# Patient Record
Sex: FEMALE | Race: WHITE | NOT HISPANIC OR LATINO
[De-identification: names, ages, dates, MRNs, and addresses within clinical notes are randomized per-mention and may not be internally consistent; named-entity substitution may affect disease eponyms.]

---

## 2017-10-30 ENCOUNTER — APPOINTMENT (OUTPATIENT)
Dept: ORTHOPEDIC SURGERY | Facility: CLINIC | Age: 42
End: 2017-10-30
Payer: COMMERCIAL

## 2017-10-30 VITALS
HEART RATE: 75 BPM | HEIGHT: 67 IN | SYSTOLIC BLOOD PRESSURE: 151 MMHG | BODY MASS INDEX: 43 KG/M2 | WEIGHT: 274 LBS | DIASTOLIC BLOOD PRESSURE: 100 MMHG

## 2017-10-30 PROCEDURE — 99204 OFFICE O/P NEW MOD 45 MIN: CPT

## 2017-10-30 PROCEDURE — 72100 X-RAY EXAM L-S SPINE 2/3 VWS: CPT

## 2017-10-30 PROCEDURE — 73565 X-RAY EXAM OF KNEES: CPT

## 2017-11-29 ENCOUNTER — FORM ENCOUNTER (OUTPATIENT)
Age: 42
End: 2017-11-29

## 2017-11-30 ENCOUNTER — OUTPATIENT (OUTPATIENT)
Dept: OUTPATIENT SERVICES | Facility: HOSPITAL | Age: 42
LOS: 1 days | End: 2017-11-30
Payer: COMMERCIAL

## 2017-11-30 ENCOUNTER — APPOINTMENT (OUTPATIENT)
Dept: MRI IMAGING | Facility: CLINIC | Age: 42
End: 2017-11-30
Payer: COMMERCIAL

## 2017-11-30 DIAGNOSIS — Z98.89 OTHER SPECIFIED POSTPROCEDURAL STATES: Chronic | ICD-10-CM

## 2017-11-30 DIAGNOSIS — Z00.8 ENCOUNTER FOR OTHER GENERAL EXAMINATION: ICD-10-CM

## 2017-11-30 PROCEDURE — 72148 MRI LUMBAR SPINE W/O DYE: CPT | Mod: 26

## 2017-11-30 PROCEDURE — 72148 MRI LUMBAR SPINE W/O DYE: CPT

## 2018-05-21 ENCOUNTER — APPOINTMENT (OUTPATIENT)
Dept: ORTHOPEDIC SURGERY | Facility: CLINIC | Age: 43
End: 2018-05-21
Payer: COMMERCIAL

## 2018-05-21 VITALS
HEIGHT: 67 IN | HEART RATE: 91 BPM | SYSTOLIC BLOOD PRESSURE: 141 MMHG | DIASTOLIC BLOOD PRESSURE: 86 MMHG | WEIGHT: 270 LBS | BODY MASS INDEX: 42.38 KG/M2

## 2018-05-21 PROCEDURE — 99214 OFFICE O/P EST MOD 30 MIN: CPT

## 2018-07-31 ENCOUNTER — APPOINTMENT (OUTPATIENT)
Dept: ORTHOPEDIC SURGERY | Facility: CLINIC | Age: 43
End: 2018-07-31
Payer: COMMERCIAL

## 2018-07-31 VITALS — HEIGHT: 67 IN | BODY MASS INDEX: 42.38 KG/M2 | WEIGHT: 270 LBS

## 2018-07-31 DIAGNOSIS — M25.562 PAIN IN LEFT KNEE: ICD-10-CM

## 2018-07-31 DIAGNOSIS — M17.12 UNILATERAL PRIMARY OSTEOARTHRITIS, LEFT KNEE: ICD-10-CM

## 2018-07-31 DIAGNOSIS — G89.29 PAIN IN LEFT KNEE: ICD-10-CM

## 2018-07-31 PROCEDURE — 73564 X-RAY EXAM KNEE 4 OR MORE: CPT | Mod: LT

## 2018-07-31 PROCEDURE — 99214 OFFICE O/P EST MOD 30 MIN: CPT

## 2018-10-08 ENCOUNTER — APPOINTMENT (OUTPATIENT)
Dept: ORTHOPEDIC SURGERY | Facility: CLINIC | Age: 43
End: 2018-10-08
Payer: COMMERCIAL

## 2018-10-08 PROCEDURE — 99214 OFFICE O/P EST MOD 30 MIN: CPT

## 2019-05-08 ENCOUNTER — APPOINTMENT (OUTPATIENT)
Dept: VASCULAR SURGERY | Facility: CLINIC | Age: 44
End: 2019-05-08
Payer: COMMERCIAL

## 2019-05-08 VITALS
BODY MASS INDEX: 43 KG/M2 | TEMPERATURE: 98.4 F | SYSTOLIC BLOOD PRESSURE: 153 MMHG | HEIGHT: 67 IN | DIASTOLIC BLOOD PRESSURE: 101 MMHG | WEIGHT: 274 LBS | HEART RATE: 81 BPM

## 2019-05-08 DIAGNOSIS — I83.90 ASYMPTOMATIC VARICOSE VEINS OF UNSPECIFIED LOWER EXTREMITY: ICD-10-CM

## 2019-05-08 PROCEDURE — 93970 EXTREMITY STUDY: CPT

## 2019-05-08 PROCEDURE — 99204 OFFICE O/P NEW MOD 45 MIN: CPT

## 2019-05-08 RX ORDER — VALACYCLOVIR 500 MG/1
TABLET, FILM COATED ORAL
Refills: 0 | Status: ACTIVE | COMMUNITY

## 2019-05-29 ENCOUNTER — RX RENEWAL (OUTPATIENT)
Age: 44
End: 2019-05-29

## 2019-05-31 NOTE — ASSESSMENT
[Arterial/Venous Disease] : arterial/venous disease [FreeTextEntry1] : Ms. MURIEL WILLIAMSON is a 44 year with persistent and worsening right lower extremity venous insufficiency, CEAP classification C _3__which is ___ unresponsive to at least 3 months of compression stockings 20-30 mmHg, leg elevation, exercise and over the counter pain medication_(Ibuprofen).  Patient has complaints of worsening __R_ leg discomfort with swelling, fatigue, heaviness, achiness, cramping, and painful varicosities.  The patient’s symptoms interfere with their ADL’s, such as walking, shopping and house work, and their ability to work and exercise. Patient has intact pulses in both legs without evidence of arterial insufficiency.  \par \par Treatment is indicated not for cosmetic reasons but for symptomatic venous reflux disease with symptoms which is refractory to conservative therapy. Venous duplex study demonstrates right  lower extremity venous insufficiency. The need for definitive effective treatment is based on severe, interfering and worsening reflux symptoms with evidence of venous insufficiency on venous ultrasound. \par \par Patient is a candidate for endovenous ablation treatment of the right  anterior GSV and r leg stab phlebectomies.  \par The risks and benefits of endovenous ablation treatment versus continued conservative management were discussed with the patient.  Patient chooses endovenous ablation treatments. Treatment plan to be scheduled. \par \par

## 2019-05-31 NOTE — HISTORY OF PRESENT ILLNESS
[FreeTextEntry1] : 45 y/o F w/ a years-long h/x of varicose veins. She describes a prominent venous bulge on her R leg, which has been present for many years. It began proximally on the lateral leg and has now been extending distally past the knee and into the lower leg. She describes some aching and a tingling sensation. Pt works as a teacher and she states the pain and tingling is worse at the end of a long day on her feet. Pt. tried to wear comrpession stockings for over 3m w/o improvement in symptoms. . She takes diclofenac PRN for her knee pain.\par \par She denies any erythema or warmth associated with the leg swelling. Denies any pain with ambulation. She denies any h/x of DTV or clotting issues, and denies any FH of hypercoagulable conditions.\par \par Pt's BP was elevated at her visit today, but states she follows regularly with her PCP.

## 2019-05-31 NOTE — DATA REVIEWED
[FreeTextEntry1] : Duplex: no DVTs, no SVTs bl \par +anteriorGSV >2sec reflux bl, R AGSV diameter: 11.3mm, L AGSV diameter: 5.1 mm\par R varicose veins diameter: 7.5mm with >2 sec reflux\par L varicose veins diameter: 4.8mm with >2 sec reflux\par \par

## 2019-05-31 NOTE — PHYSICAL EXAM
[Normal Thyroid] : the thyroid was normal [Normal Breath Sounds] : Normal breath sounds [Normal Heart Sounds] : normal heart sounds [Normal Rate and Rhythm] : normal rate and rhythm [2+] : left 2+ [Varicose Veins Of Lower Extremities] : present [No HSM] : no hepatosplenomegaly [No Rash or Lesion] : No rash or lesion [Alert] : alert [Oriented to Person] : oriented to person [Oriented to Place] : oriented to place [Calm] : calm [Oriented to Time] : oriented to time [Ankle Swelling (On Exam)] : present [Ankle Swelling On The Left] : moderate [Ankle Swelling On The Right] : mild [JVD] : no jugular venous distention  [Right Femoral Bruit] : no bruit heard over the right femoral artery [Left Femoral Bruit] : no bruit heard over the left femoral artery [Abdomen Masses] : No abdominal masses [Abdomen Tenderness] : ~T ~M No abdominal tenderness [de-identified] : well-appearing, no apparent distress [de-identified] : sclera anicteric [de-identified] : deferred [de-identified] : deferred [de-identified] : No swelling or erythema of the joints.

## 2019-06-04 ENCOUNTER — APPOINTMENT (OUTPATIENT)
Dept: VASCULAR SURGERY | Facility: CLINIC | Age: 44
End: 2019-06-04
Payer: COMMERCIAL

## 2019-06-04 PROCEDURE — 36475 ENDOVENOUS RF 1ST VEIN: CPT | Mod: RT

## 2019-06-04 NOTE — PROCEDURE
[FreeTextEntry3] : Procedural safety checklist and time out completed:\par Confirmed patient identification (Patient Name, , and/or medical record number including when possible affirmation by patient or parent/family/other.\par Confirmed procedure with the patient. Consent present, accurate and signed. \par Confirmed special equipment and supplies are present.\par Sterility confirmed. Position verified. \par Site/ side is marked and visible and confirmed. \par Procedure confirmed by consent. Accurate consent including side and site.\par Review of medical records noting correct procedure including site and side.\par MD/PA verifies presence and review of imaging studies and or written report of imaging studies.\par Specify equipment are available for the planned procedure.\par MD/PA has marked the patient's procedural site and side.\par Agreement on the procedure to be performed\par Time out completed.\par All of the above has been confirmed by the team.\par All patient-specific concerns have been addressed\par \par Indication: Right  lower extremity varicose veins with inflammation, leg pain, leg swelling, and leg cramping.  Venous insufficiency/ reflux.\par \par Procedure: radiofrequency ablation right  anterior great saphenous vein. \par 	\par [Ms. MURIEL WILLIAMSON is a 44 year old F with a history of  right lower extremity varicose veins previously seen in the office.  Ultrasound examination demonstrated venous insufficiency. A trial of compression stockings, exercise, elevation, and pain medication was attempted without relief and definitive treatment with radiofrequency ablation was offered. \par \par I have discussed the risks of the procedure at length with the patient. The risks discussed were inclusive of but not limited to infection, irritation at the site of infiltration of local anesthesia, and also rare risk of deep venous thrombosis and pulmonary emboli. The patient agrees to proceed with the procedure. \par The patient was escorted into the procedure room and a time out called.\par The entire limb was prepped and draped in sterile fashion. The RF fiber was placed on the sterile field and connected by a sterile cable. Actuation, temperature, and impedance testing were performed to ensure that all components were connected and operating properly. \par The patient was placed on the procedure table and local anesthesia was instilled in the skin overlying the access site. Under ultrasound guidance, the vein was punctured with a micropuncture needle, using the anterior wall technique. A guide wire was now introduced through the needle, and the needle was then exchanged over the guide wire for a 7F sheath. The guide wire was removed and the RF probe was then placed into the  right anterior great saphenous vein through the sheath and position confirmed using ultrasound guidance. After the RF probe position was verified by ultrasound, tumescent anesthesia consisting of normal saline, 1% lidocaine with 8.4% sodium bicarbonate was infiltrated, under ultrasound guidance, precisely into the perivenous compartment along the entire length of the vein until a “halo” of fluid was noted around the vein. After RF probe position was again confirmed with ultrasound imaging, RF energy was applied. The probe was gradually and carefully withdrawn at a rate of 6.5cm/20seconds. \par \par The total volume injected was _200_ cc. \par Total treatment time was _100_ seconds.\par Treatment length was _26.5_ cm \par _5_cycles of RF performed using the __7 cm probe. \par 3.6_cm from SFJ/SPJ\par \par Estimated Blood Loss: minimal\par Repeat ultrasound of the treated vein was performed confirming successful treatment. The catheter and sheath were withdrawn and hemostasis established with direct pressure. After assuring hemostasis, a sterile 4x4 was placed on the access site and an ACE compression wrap was applied. Patient tolerated procedure well. Patient was given post-procedure instructions and follow up appointment was scheduled.\par \par \par

## 2019-06-07 ENCOUNTER — APPOINTMENT (OUTPATIENT)
Dept: VASCULAR SURGERY | Facility: CLINIC | Age: 44
End: 2019-06-07
Payer: COMMERCIAL

## 2019-06-07 PROCEDURE — 93971 EXTREMITY STUDY: CPT

## 2019-06-14 ENCOUNTER — APPOINTMENT (OUTPATIENT)
Dept: VASCULAR SURGERY | Facility: CLINIC | Age: 44
End: 2019-06-14
Payer: COMMERCIAL

## 2019-06-14 PROCEDURE — 93971 EXTREMITY STUDY: CPT

## 2019-06-17 ENCOUNTER — RX RENEWAL (OUTPATIENT)
Age: 44
End: 2019-06-17

## 2019-06-27 ENCOUNTER — RX RENEWAL (OUTPATIENT)
Age: 44
End: 2019-06-27

## 2019-07-05 ENCOUNTER — APPOINTMENT (OUTPATIENT)
Dept: VASCULAR SURGERY | Facility: CLINIC | Age: 44
End: 2019-07-05
Payer: COMMERCIAL

## 2019-07-05 ENCOUNTER — APPOINTMENT (OUTPATIENT)
Dept: VASCULAR SURGERY | Facility: CLINIC | Age: 44
End: 2019-07-05

## 2019-07-05 PROCEDURE — 37766 PHLEB VEINS - EXTREM 20+: CPT | Mod: RT

## 2019-07-05 NOTE — PROCEDURE
[FreeTextEntry1] : right stab phlebectomy [FreeTextEntry3] : Procedural safety checklist and time out completed:\par Confirmed patient identification (Patient Name, , and/or medical record number including when possible affirmation by patient or parent/family/other.\par Confirmed procedure with the patient. Consent present, accurate and signed. \par Confirmed special equipment and supplies are present.\par Sterility confirmed. Position verified. \par Site/ side is marked and visible and confirmed. \par Procedure confirmed by consent. Accurate consent including side and site.\par Review of medical records noting correct procedure including site and side.\par MD/PA verifies presence and review of imaging studies and or written report of imaging studies.\par Specify equipment are available for the planned procedure.\par MD/PA has marked the patient's procedural site and side.\par Agreement on the procedure to be performed\par Time out completed.\par All of the above has been confirmed by the team.\par All patient-specific concerns have been addressed. \par \par Indication:  Right lower extremity varicose veins with inflammation, leg pain, leg swelling, and leg cramping.  \par \par Procedure: Stab phlebectomy right lower extremity\par \par  Ms. MURIEL WILLIAMSON is a 44 year old F with a history of symptomatic right lower extremity varicose veins. A trial of compression stockings, exercise, elevation, and pain medication was attempted without relief and definitive treatment with microphlebectomy was offered. \par \par I have discussed the risks of the procedure at length with the patient. The risks discussed were inclusive of but not limited to infection, irritation at the site of infiltration of local anesthesia, and also rare risk of deep venous thrombosis and pulmonary emboli. The patient agrees to proceed with the procedure. \par The patient was escorted into the procedure room, the varicose veins for treatment were marked out and the patient placed on the examination table. The entire limb was prepped and draped in sterile fashion and a  time out was called. \par \par Local anesthesia using _70_ cc 1% lidocaine was infiltrated using a 25 gauge needle over the previously marked prominent varicose vein sites.\par Multiple small stab incisions, each less than 1 cm in length was made at the noted sites. With the help of a vein hook, the vein was fished out at each of these sites, rolled over a narrow-tipped mosquito clamp and removed. Hemostasis was secured with leg elevation and application of manual pressure. After assuring hemostasis, a sterile 4x4 was placed on the access sites and an ACE compression wrap was applied. Estimated blood loss: minimal. Patient tolerated procedure well. Patient was given post-procedure instructions and follow up appointment was scheduled. \par \par SIDE - RIGHT \par SITE - CALF / THIGH\par LOCATION - PROXIMAL / DISTAL / LATERAL / ANTERIOR / POSTERIOR	\par \par Total Stab Incisions ____27____\par \par

## 2019-07-19 ENCOUNTER — APPOINTMENT (OUTPATIENT)
Dept: VASCULAR SURGERY | Facility: CLINIC | Age: 44
End: 2019-07-19
Payer: COMMERCIAL

## 2019-07-19 ENCOUNTER — APPOINTMENT (OUTPATIENT)
Dept: VASCULAR SURGERY | Facility: CLINIC | Age: 44
End: 2019-07-19

## 2019-07-19 VITALS
BODY MASS INDEX: 44.73 KG/M2 | SYSTOLIC BLOOD PRESSURE: 133 MMHG | DIASTOLIC BLOOD PRESSURE: 87 MMHG | HEIGHT: 67 IN | TEMPERATURE: 99.1 F | WEIGHT: 285 LBS | HEART RATE: 85 BPM

## 2019-07-19 DIAGNOSIS — I83.893 VARICOSE VEINS OF BILATERAL LOWER EXTREMITIES WITH OTHER COMPLICATIONS: ICD-10-CM

## 2019-07-19 PROCEDURE — 99024 POSTOP FOLLOW-UP VISIT: CPT

## 2019-07-19 RX ORDER — LIDOCAINE HYDROCHLORIDE 10 MG/ML
1 INJECTION, SOLUTION INFILTRATION; PERINEURAL
Qty: 50 | Refills: 0 | Status: COMPLETED | COMMUNITY
Start: 2019-05-29 | End: 2019-07-19

## 2019-07-19 RX ORDER — ALPRAZOLAM 0.5 MG/1
0.5 TABLET ORAL
Qty: 1 | Refills: 0 | Status: COMPLETED | COMMUNITY
Start: 2019-05-29 | End: 2019-07-19

## 2019-07-19 RX ORDER — SODIUM CHLORIDE 9 G/ML
0.9 INJECTION, SOLUTION INTRAVENOUS
Qty: 500 | Refills: 0 | Status: COMPLETED | COMMUNITY
Start: 2019-05-29 | End: 2019-07-19

## 2019-07-19 RX ORDER — SODIUM BICARBONATE 84 MG/ML
8.4 INJECTION, SOLUTION INTRAVENOUS
Qty: 5 | Refills: 0 | Status: COMPLETED | COMMUNITY
Start: 2019-05-29 | End: 2019-07-19

## 2019-07-19 NOTE — PHYSICAL EXAM
[JVD] : no jugular venous distention  [2+] : left 2+ [Varicose Veins Of Lower Extremities] : present [Varicose Veins Of The Left Leg] : of the left leg [No Rash or Lesion] : No rash or lesion [Skin Ulcer] : no ulcer [Skin Induration] : no induration [Alert] : alert [Oriented to Person] : oriented to person [Oriented to Place] : oriented to place [Oriented to Time] : oriented to time [de-identified] : well appearing obese female in NAD [de-identified] : NC / AT [de-identified] : unlabored respirations [de-identified] : reg rhythm [de-identified] : RLE incisions clean, dry and well healed, minimal ecchymosis, no induration. stitch removed from right posterior calf incision.No visible varicosities.\par \par LLE with moderate-sized varicosities predominantly along the anterior thigh and knee area [de-identified] : pleasant and cooperative

## 2019-07-19 NOTE — REASON FOR VISIT
[de-identified] : right anterior great saphenous vein ablation and right stab phlebectomy [de-identified] : Ms. MURIEL WILLIAMSON is a 44 year who presents to the office for follow-up evaluation of her venous insufficiency and varicose veins. Patient is s/p radiofrequency ablation of the right anterior great saphenous vein and right stab phlebectomy on 7/2/2019 and 7/5/2019. Patient is doing well without complaints. Patient reports a decrease in leg swelling, leg pain, and no longer has painful bulging varicosities. Patient denies fever or chills. She reports no bruising or parasthesias. She is very pleased with the results. She has been compliant with her compression stockings.\par \par Patient continues to report left leg discomfort, pain, predominantly along the bulging varicosities and leg swelling despite wearing her compression stockings. 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

## 2019-07-19 NOTE — ADDENDUM
[FreeTextEntry1] : Ms. MURIEL WILLIAMSON is a 44 year who presents to the office for follow-up evaluation of her venous insufficiency and varicose veins. Patient is s/p radiofrequency ablation of the right anterior great saphenous vein and right stab phlebectomy on 7/2/2019 and 7/5/2019. Patient is doing well without complaints. Patient reports a decrease in leg swelling, leg pain, and no longer has painful bulging varicosities. Patient denies fever or chills. She reports no bruising or parasthesias. She is very pleased with the results. She has been compliant with her compression stockings.\par \par Patient continues to report left leg discomfort, pain, predominantly along the bulging varicosities and leg swelling despite wearing her compression stockings. 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 No residual varicosities or leg pain on the right lower extremity. Patient is very pleased with the result and how she feels. Patient with continuos leg pain and swelling of the left lower extremity despite conservative management with compression therapy and leg elevation. Ultrasound showed tributary diameters of less than 3.5 mm and phlebectomy was denied. Re-evaluation patient in 3-6 months. Continue conservative management with compression stockings of medical grade 20-30mmHg, NSAIDs prn, and leg elevation.Will repeat left leg venous lower extremity ultrasound at follow-up visit.

## 2019-07-19 NOTE — REVIEW OF SYSTEMS
[Fever] : no fever [Chills] : no chills [Chest Pain] : no chest pain [Palpitations] : no palpitations [Leg Claudication] : no intermittent leg claudication [Shortness Of Breath] : no shortness of breath [Limb Pain] : limb pain [Limb Weakness] : no limb weakness [Easy Bleeding] : no tendency for easy bleeding [Easy Bruising] : no tendency for easy bruising

## 2020-10-12 ENCOUNTER — APPOINTMENT (OUTPATIENT)
Dept: ORTHOPEDIC SURGERY | Facility: CLINIC | Age: 45
End: 2020-10-12
Payer: COMMERCIAL

## 2020-10-12 VITALS
HEIGHT: 67 IN | WEIGHT: 279 LBS | HEART RATE: 55 BPM | DIASTOLIC BLOOD PRESSURE: 96 MMHG | BODY MASS INDEX: 43.79 KG/M2 | SYSTOLIC BLOOD PRESSURE: 145 MMHG

## 2020-10-12 VITALS — TEMPERATURE: 97.4 F

## 2020-10-12 DIAGNOSIS — M54.16 RADICULOPATHY, LUMBAR REGION: ICD-10-CM

## 2020-10-12 PROCEDURE — 99214 OFFICE O/P EST MOD 30 MIN: CPT

## 2020-10-12 NOTE — DISCUSSION/SUMMARY
[de-identified] : Left sided radiculopathy\par We discussed all options.\par MDP x2.\par PT Lumbar. \par Getting new MRI lumbar.\par F/u to review MRI results. \par F?u in 2/3 weeks.\par All questions were answered, all alternatives discussed and the patient is in complete agreement with that plan. Follow-up appointment as instructed. Any issues and the patient will call or come in sooner.

## 2020-10-12 NOTE — ADDENDUM
[FreeTextEntry1] : This note was authored by Oralia Choe working as a medical scribe for Dr. Rohit Anderson. The note was reviewed, edited, and revised by Dr. Rohit Anderson whom is in agreement with the exam findings, imaging findings, and treatment plan. 10/12/2020.

## 2020-10-12 NOTE — HISTORY OF PRESENT ILLNESS
[Pain] : pain [Stable] : stable [All Other ROS Normal] : All other review of systems are negative except as noted [Worsening] : worsening [de-identified] : 45 year female presents for follow up evaluation of lower back pain. \par Last seen in Oct 2018 for L sided radiculopathy, was recommended to follow up with pain management. \par She is still having back pains and would like PT.\par She is having sciatic pain down her left leg. \par No fever chills sweats nausea vomiting no bowel or bladder dysfunction, no recent weight loss or gain no night pain. This history is in addition to the intake form that I personally reviewed.  [Headache] : no headache [Dizziness] : no dizziness [Fainting] : no fainting [FreeTextEntry1] : \par

## 2020-10-12 NOTE — PHYSICAL EXAM
[Normal] : Gait: normal [Hatch's Sign] : negative Hatch's sign [Pronator Drift] : negative pronator drift [SLR] : negative straight leg raise [de-identified] : 5 out of 5 motor strength, sensation is intact and symmetrical full range of motion flexion extension and rotation, no palpatory tenderness full range of motion of hips knees shoulders and elbows (all four extremities), no atrophy, negative straight leg raise, no pathological reflexes, no swelling, normal ambulation, no apparent distress skin is intact, no palpable lymph nodes, no upper or lower extremity instability, alert and oriented x3 and normal mood. Normal finger-to nose test. \par  [de-identified] : AP/alt knees-mild arthritis bilateral knees-\par  AP/lat lumbar-decreased lordosis, decreased disc height, no spondylolisthesis-reviewed with the patient.  \par MRI lumbar-11/2017 severe foraminal stenosis [id number] with the patient.  [Poor Appearance] : well-appearing [Acute Distress] : not in acute distress [Abl Mood] : in a normal mood [Abl Affect] : with normal affect [Poor Coordination] : normal coordination [Disorientation] : oriented x 3 [FreeTextEntry2] : \par

## 2021-03-04 DIAGNOSIS — Z78.9 OTHER SPECIFIED HEALTH STATUS: ICD-10-CM

## 2021-03-04 DIAGNOSIS — Z00.00 ENCOUNTER FOR GENERAL ADULT MEDICAL EXAMINATION W/OUT ABNORMAL FINDINGS: ICD-10-CM

## 2021-03-04 DIAGNOSIS — Z86.018 PERSONAL HISTORY OF OTHER BENIGN NEOPLASM: ICD-10-CM

## 2021-03-04 DIAGNOSIS — N84.1 POLYP OF CERVIX UTERI: ICD-10-CM

## 2021-03-04 DIAGNOSIS — Z80.3 FAMILY HISTORY OF MALIGNANT NEOPLASM OF BREAST: ICD-10-CM

## 2021-03-04 DIAGNOSIS — F17.200 NICOTINE DEPENDENCE, UNSPECIFIED, UNCOMPLICATED: ICD-10-CM

## 2021-03-04 LAB — CYTOLOGY CVX/VAG DOC THIN PREP: NORMAL

## 2021-05-03 ENCOUNTER — APPOINTMENT (OUTPATIENT)
Dept: OBGYN | Facility: CLINIC | Age: 46
End: 2021-05-03
Payer: COMMERCIAL

## 2021-05-03 VITALS — TEMPERATURE: 97.2 F | BODY MASS INDEX: 44.73 KG/M2 | WEIGHT: 285 LBS | HEIGHT: 67 IN

## 2021-05-03 PROCEDURE — 99213 OFFICE O/P EST LOW 20 MIN: CPT

## 2021-05-03 PROCEDURE — 76830 TRANSVAGINAL US NON-OB: CPT

## 2021-05-03 PROCEDURE — 99072 ADDL SUPL MATRL&STAF TM PHE: CPT

## 2021-05-03 PROCEDURE — 93975 VASCULAR STUDY: CPT

## 2021-05-03 PROCEDURE — 76856 US EXAM PELVIC COMPLETE: CPT | Mod: 59

## 2021-05-03 NOTE — REVIEW OF SYSTEMS
[Frequency] : frequency [Abn Vaginal bleeding] : abnormal vaginal bleeding [Pelvic pain] : pelvic pain

## 2021-05-03 NOTE — PROCEDURE
[Transvaginal Ultrasound] : transvaginal ultrasound [Color Doppler Imaging] : color doppler imaging [FreeTextEntry9] : see active problem list [de-identified] : TRANSABDOMINAL ULTRASOUND \par  [FreeTextEntry3] : see scanned sono report

## 2021-05-03 NOTE — HISTORY OF PRESENT ILLNESS
[Y] : Patient is sexually active [N] : Patient denies prior pregnancies [FreeTextEntry1] : 44 YO PRESENTS FOR ULTRASOUND  DUE TO LARGE FIBROID UTERUS [TextBox_4] : ALSO C/O URINARY FREQUENCY, RIGHT LOWER GROIN PRESSURE AND CHANGE IN PERIOD THIS PAST MONTH- DURATION OF 10 DAYS [Mammogramdate] : 01/21 [BreastSonogramDate] : 01/21 [PapSmeardate] : 11/20 [HPVDate] : 11/20 [LMPDate] : 04/25/21 [MensesFreq] : 28 [MensesLength] : 5 [TextBox_6] : 04/25/21

## 2021-05-03 NOTE — DISCUSSION/SUMMARY
[FreeTextEntry1] : Large fibroid uterus\par DUB\par Urinary frequency\par Pelvic pain\par pt presents for a pelvic sono- see report\par Quality of life is being compromised\par pt counselled on UAE and sent to Dr Laguna\par RTO for a uterine sampling

## 2021-05-12 ENCOUNTER — NON-APPOINTMENT (OUTPATIENT)
Age: 46
End: 2021-05-12

## 2021-05-22 ENCOUNTER — TRANSCRIPTION ENCOUNTER (OUTPATIENT)
Age: 46
End: 2021-05-22

## 2021-05-27 ENCOUNTER — APPOINTMENT (OUTPATIENT)
Dept: OBGYN | Facility: CLINIC | Age: 46
End: 2021-05-27
Payer: COMMERCIAL

## 2021-05-27 DIAGNOSIS — N93.8 OTHER SPECIFIED ABNORMAL UTERINE AND VAGINAL BLEEDING: ICD-10-CM

## 2021-05-27 DIAGNOSIS — Z87.42 PERSONAL HISTORY OF OTHER DISEASES OF THE FEMALE GENITAL TRACT: ICD-10-CM

## 2021-05-27 LAB
HCG UR QL: NEGATIVE
QUALITY CONTROL: YES

## 2021-05-27 PROCEDURE — 81025 URINE PREGNANCY TEST: CPT

## 2021-05-27 PROCEDURE — 99213 OFFICE O/P EST LOW 20 MIN: CPT | Mod: 25

## 2021-05-27 PROCEDURE — 58100 BIOPSY OF UTERUS LINING: CPT

## 2021-05-27 NOTE — PROCEDURE
[Endometrial Biopsy] : Endometrial biopsy [Time out performed] : Pre-procedure time out performed.  Patient's name, date of birth and procedure confirmed. [Consent Obtained] : Consent obtained [Irregular Bleeding] : irregular uterine bleeding [Risks] : risks [Benefits] : benefits [Alternatives] : alternatives [Patient] : patient [Infection] : infection [Bleeding] : bleeding [Allergic Reaction] : allergic reaction [Uterine Perforation] : uterine perforation [Pain] : pain [Negative] : negative pregnancy test [No Premedication] : No premedication [Betadine] : Betadine [None] : none [Easy Passage] : Easy passage [Sounded to ___ cm] : sounded to [unfilled] ~Ucm [Moderate] : moderate [Tolerated Well] : Patient tolerated the procedure well [No Complications] : No complications [LMPDate] : 05/09/2021

## 2021-05-27 NOTE — PLAN
[FreeTextEntry1] : PT PRESENTS WITH DUB AND LARGE FIBROID UTERUS FOR A UTERINE SAMPLING PRE EVALUATION TO SEE DR NESS AYERS FOR CONSULT FOR A UAE\par PT HAS AN APPT FOR A PELVIC MRI AND ALSO AN UPCOMING APPT TO SEE DR NESS CHAVEZ

## 2021-06-06 ENCOUNTER — TRANSCRIPTION ENCOUNTER (OUTPATIENT)
Age: 46
End: 2021-06-06

## 2021-06-06 LAB — CORE LAB BIOPSY: NORMAL

## 2021-06-13 ENCOUNTER — APPOINTMENT (OUTPATIENT)
Dept: MRI IMAGING | Facility: CLINIC | Age: 46
End: 2021-06-13
Payer: COMMERCIAL

## 2021-06-13 ENCOUNTER — OUTPATIENT (OUTPATIENT)
Dept: OUTPATIENT SERVICES | Facility: HOSPITAL | Age: 46
LOS: 1 days | End: 2021-06-13
Payer: COMMERCIAL

## 2021-06-13 DIAGNOSIS — Z98.89 OTHER SPECIFIED POSTPROCEDURAL STATES: Chronic | ICD-10-CM

## 2021-06-13 DIAGNOSIS — D21.9 BENIGN NEOPLASM OF CONNECTIVE AND OTHER SOFT TISSUE, UNSPECIFIED: ICD-10-CM

## 2021-06-13 PROCEDURE — A9585: CPT

## 2021-06-13 PROCEDURE — 72197 MRI PELVIS W/O & W/DYE: CPT

## 2021-06-13 PROCEDURE — 72197 MRI PELVIS W/O & W/DYE: CPT | Mod: 26

## 2021-06-14 ENCOUNTER — TRANSCRIPTION ENCOUNTER (OUTPATIENT)
Age: 46
End: 2021-06-14

## 2021-07-16 ENCOUNTER — APPOINTMENT (OUTPATIENT)
Dept: INTERVENTIONAL RADIOLOGY/VASCULAR | Facility: CLINIC | Age: 46
End: 2021-07-16

## 2021-07-16 VITALS — RESPIRATION RATE: 18 BRPM | HEIGHT: 67 IN | BODY MASS INDEX: 44.89 KG/M2 | WEIGHT: 286 LBS

## 2021-07-16 PROCEDURE — XXXXX: CPT | Mod: 1L

## 2021-07-16 RX ORDER — LOSARTAN POTASSIUM AND HYDROCHLOROTHIAZIDE 25; 100 MG/1; MG/1
100-25 TABLET ORAL
Refills: 0 | Status: ACTIVE | COMMUNITY

## 2021-07-16 RX ORDER — METHYLPREDNISOLONE 4 MG/1
4 TABLET ORAL
Qty: 1 | Refills: 1 | Status: COMPLETED | COMMUNITY
Start: 2020-10-12 | End: 2021-07-16

## 2021-07-16 RX ORDER — DICLOFENAC SODIUM 75 MG/1
75 TABLET, DELAYED RELEASE ORAL
Qty: 1 | Refills: 1 | Status: COMPLETED | COMMUNITY
Start: 2018-10-08 | End: 2021-07-16

## 2021-07-29 ENCOUNTER — APPOINTMENT (OUTPATIENT)
Dept: OBGYN | Facility: CLINIC | Age: 46
End: 2021-07-29
Payer: COMMERCIAL

## 2021-07-29 VITALS
WEIGHT: 291 LBS | BODY MASS INDEX: 45.67 KG/M2 | SYSTOLIC BLOOD PRESSURE: 132 MMHG | DIASTOLIC BLOOD PRESSURE: 86 MMHG | HEART RATE: 90 BPM | HEIGHT: 67 IN

## 2021-07-29 DIAGNOSIS — N84.0 POLYP OF CORPUS UTERI: ICD-10-CM

## 2021-07-29 PROCEDURE — 99213 OFFICE O/P EST LOW 20 MIN: CPT

## 2021-08-06 ENCOUNTER — OUTPATIENT (OUTPATIENT)
Dept: OUTPATIENT SERVICES | Facility: HOSPITAL | Age: 46
LOS: 1 days | End: 2021-08-06
Payer: COMMERCIAL

## 2021-08-06 VITALS
SYSTOLIC BLOOD PRESSURE: 135 MMHG | TEMPERATURE: 97 F | HEART RATE: 63 BPM | HEIGHT: 67.5 IN | RESPIRATION RATE: 16 BRPM | WEIGHT: 293 LBS | DIASTOLIC BLOOD PRESSURE: 74 MMHG | OXYGEN SATURATION: 98 %

## 2021-08-06 DIAGNOSIS — D25.9 LEIOMYOMA OF UTERUS, UNSPECIFIED: ICD-10-CM

## 2021-08-06 DIAGNOSIS — E66.01 MORBID (SEVERE) OBESITY DUE TO EXCESS CALORIES: ICD-10-CM

## 2021-08-06 DIAGNOSIS — I49.3 VENTRICULAR PREMATURE DEPOLARIZATION: ICD-10-CM

## 2021-08-06 DIAGNOSIS — Z87.19 PERSONAL HISTORY OF OTHER DISEASES OF THE DIGESTIVE SYSTEM: Chronic | ICD-10-CM

## 2021-08-06 DIAGNOSIS — Z98.89 OTHER SPECIFIED POSTPROCEDURAL STATES: Chronic | ICD-10-CM

## 2021-08-06 LAB
ALBUMIN SERPL ELPH-MCNC: 4.2 G/DL — SIGNIFICANT CHANGE UP (ref 3.3–5)
ALP SERPL-CCNC: 62 U/L — SIGNIFICANT CHANGE UP (ref 40–120)
ALT FLD-CCNC: 15 U/L — SIGNIFICANT CHANGE UP (ref 4–33)
ANION GAP SERPL CALC-SCNC: 12 MMOL/L — SIGNIFICANT CHANGE UP (ref 7–14)
AST SERPL-CCNC: 14 U/L — SIGNIFICANT CHANGE UP (ref 4–32)
BILIRUB DIRECT SERPL-MCNC: <0.2 MG/DL — SIGNIFICANT CHANGE UP (ref 0–0.2)
BILIRUB INDIRECT FLD-MCNC: >0.2 MG/DL — SIGNIFICANT CHANGE UP (ref 0–1)
BILIRUB SERPL-MCNC: 0.4 MG/DL — SIGNIFICANT CHANGE UP (ref 0.2–1.2)
BUN SERPL-MCNC: 11 MG/DL — SIGNIFICANT CHANGE UP (ref 7–23)
CALCIUM SERPL-MCNC: 9.4 MG/DL — SIGNIFICANT CHANGE UP (ref 8.4–10.5)
CHLORIDE SERPL-SCNC: 105 MMOL/L — SIGNIFICANT CHANGE UP (ref 98–107)
CO2 SERPL-SCNC: 22 MMOL/L — SIGNIFICANT CHANGE UP (ref 22–31)
CREAT SERPL-MCNC: 0.67 MG/DL — SIGNIFICANT CHANGE UP (ref 0.5–1.3)
GLUCOSE SERPL-MCNC: 113 MG/DL — HIGH (ref 70–99)
HCG UR QL: NEGATIVE — SIGNIFICANT CHANGE UP
HCT VFR BLD CALC: 46.6 % — HIGH (ref 34.5–45)
HGB BLD-MCNC: 15.3 G/DL — SIGNIFICANT CHANGE UP (ref 11.5–15.5)
MCHC RBC-ENTMCNC: 29.7 PG — SIGNIFICANT CHANGE UP (ref 27–34)
MCHC RBC-ENTMCNC: 32.8 GM/DL — SIGNIFICANT CHANGE UP (ref 32–36)
MCV RBC AUTO: 90.5 FL — SIGNIFICANT CHANGE UP (ref 80–100)
NRBC # BLD: 0 /100 WBCS — SIGNIFICANT CHANGE UP
NRBC # FLD: 0 K/UL — SIGNIFICANT CHANGE UP
PLATELET # BLD AUTO: 266 K/UL — SIGNIFICANT CHANGE UP (ref 150–400)
POTASSIUM SERPL-MCNC: 4 MMOL/L — SIGNIFICANT CHANGE UP (ref 3.5–5.3)
POTASSIUM SERPL-SCNC: 4 MMOL/L — SIGNIFICANT CHANGE UP (ref 3.5–5.3)
PROT SERPL-MCNC: 6.9 G/DL — SIGNIFICANT CHANGE UP (ref 6–8.3)
RBC # BLD: 5.15 M/UL — SIGNIFICANT CHANGE UP (ref 3.8–5.2)
RBC # FLD: 12.8 % — SIGNIFICANT CHANGE UP (ref 10.3–14.5)
SODIUM SERPL-SCNC: 139 MMOL/L — SIGNIFICANT CHANGE UP (ref 135–145)
WBC # BLD: 8.01 K/UL — SIGNIFICANT CHANGE UP (ref 3.8–10.5)
WBC # FLD AUTO: 8.01 K/UL — SIGNIFICANT CHANGE UP (ref 3.8–10.5)

## 2021-08-06 PROCEDURE — 93010 ELECTROCARDIOGRAM REPORT: CPT

## 2021-08-06 NOTE — H&P PST ADULT - HISTORY OF PRESENT ILLNESS
This is a 47 y/o female who presents with pain from known uterine fibroid. Visited MD with subsequent sonogram and MRI to confirm fibroid. Recommended intervention. Scheduled for uterine artery embolization

## 2021-08-06 NOTE — H&P PST ADULT - NSICDXPASTMEDICALHX_GEN_ALL_CORE_FT
PAST MEDICAL HISTORY:  Choledocholithiasis     Cholelithiasis     Elevated liver function tests     Obesity     Uterine fibroid July 2021

## 2021-08-06 NOTE — H&P PST ADULT - NSANTHOSAYNRD_GEN_A_CORE
No. SIVAN screening performed.  STOP BANG Legend: 0-2 = LOW Risk; 3-4 = INTERMEDIATE Risk; 5-8 = HIGH Risk

## 2021-08-06 NOTE — H&P PST ADULT - NSICDXPROBLEM_GEN_ALL_CORE_FT
PROBLEM DIAGNOSES  Problem: Uterine fibroid  Assessment and Plan: This is a 45 y/o female who is scheduled for UAE on 8-23-21  * Instructed to speak with surgeon regarding covid testing.  * Given preop instructions      Problem: Frequent PVCs  Assessment and Plan: Await cardiac evaluation from pcp/cardiologist due to frequent PVCs on PAST ekg with bigeminy and trigeminy  * Instructed to take normal am dose of losartan/HCTZ the am of surgery    Problem: Morbid obesity  Assessment and Plan: Notified OR booking via fax of bmi > 45       PROBLEM DIAGNOSES  Problem: Uterine fibroid  Assessment and Plan: This is a 45 y/o female who is scheduled for UAE on 8-23-21  * Instructed to speak with surgeon regarding covid testing.  * Given preop instructions      Problem: Frequent PVCs  Assessment and Plan: Await cardiac evaluation from pcp/cardiologist due to frequent PVCs on PAST ekg with bigeminy and trigeminy--left message on NP's voice mail in radiology department   * Instructed to take normal am dose of losartan/HCTZ the am of surgery    Problem: Morbid obesity  Assessment and Plan: Notified OR booking via fax of bmi > 45

## 2021-08-06 NOTE — H&P PST ADULT - NSICDXPASTSURGICALHX_GEN_ALL_CORE_FT
PAST SURGICAL HISTORY:  H/O cholelithiasis s/p cholecystectomy in ? 2010    S/P ERCP 7/8/2015 - with placement of biliary stent--patient not sure if she had a stent inserted

## 2021-08-06 NOTE — H&P PST ADULT - NSICDXFAMILYHX_GEN_ALL_CORE_FT
FAMILY HISTORY:  Mother  Still living? Unknown  Family history of cardiomyopathy, Age at diagnosis: Age Unknown

## 2021-08-29 ENCOUNTER — APPOINTMENT (OUTPATIENT)
Dept: DISASTER EMERGENCY | Facility: CLINIC | Age: 46
End: 2021-08-29

## 2021-08-29 DIAGNOSIS — Z01.818 ENCOUNTER FOR OTHER PREPROCEDURAL EXAMINATION: ICD-10-CM

## 2021-08-31 PROBLEM — E66.9 OBESITY, UNSPECIFIED: Chronic | Status: ACTIVE | Noted: 2021-08-06

## 2021-09-01 ENCOUNTER — RESULT REVIEW (OUTPATIENT)
Age: 46
End: 2021-09-01

## 2021-09-01 ENCOUNTER — INPATIENT (INPATIENT)
Facility: HOSPITAL | Age: 46
LOS: 0 days | Discharge: HOME | End: 2021-09-02
Attending: SPECIALIST | Admitting: SPECIALIST
Payer: COMMERCIAL

## 2021-09-01 VITALS
TEMPERATURE: 98 F | SYSTOLIC BLOOD PRESSURE: 121 MMHG | OXYGEN SATURATION: 99 % | WEIGHT: 293 LBS | DIASTOLIC BLOOD PRESSURE: 66 MMHG | RESPIRATION RATE: 18 BRPM | HEART RATE: 86 BPM | HEIGHT: 67 IN

## 2021-09-01 DIAGNOSIS — I10 ESSENTIAL (PRIMARY) HYPERTENSION: ICD-10-CM

## 2021-09-01 DIAGNOSIS — Z87.19 PERSONAL HISTORY OF OTHER DISEASES OF THE DIGESTIVE SYSTEM: Chronic | ICD-10-CM

## 2021-09-01 DIAGNOSIS — E66.01 MORBID (SEVERE) OBESITY DUE TO EXCESS CALORIES: ICD-10-CM

## 2021-09-01 DIAGNOSIS — R00.0 TACHYCARDIA, UNSPECIFIED: ICD-10-CM

## 2021-09-01 DIAGNOSIS — Z98.89 OTHER SPECIFIED POSTPROCEDURAL STATES: Chronic | ICD-10-CM

## 2021-09-01 DIAGNOSIS — D25.9 LEIOMYOMA OF UTERUS, UNSPECIFIED: ICD-10-CM

## 2021-09-01 LAB
A1C WITH ESTIMATED AVERAGE GLUCOSE RESULT: 5.7 % — HIGH (ref 4–5.6)
ALBUMIN SERPL ELPH-MCNC: 4.1 G/DL — SIGNIFICANT CHANGE UP (ref 3.3–5)
ALP SERPL-CCNC: 63 U/L — SIGNIFICANT CHANGE UP (ref 40–120)
ALT FLD-CCNC: 24 U/L — SIGNIFICANT CHANGE UP (ref 4–33)
ANION GAP SERPL CALC-SCNC: 14 MMOL/L — SIGNIFICANT CHANGE UP (ref 7–14)
AST SERPL-CCNC: 17 U/L — SIGNIFICANT CHANGE UP (ref 4–32)
BILIRUB SERPL-MCNC: 0.4 MG/DL — SIGNIFICANT CHANGE UP (ref 0.2–1.2)
BUN SERPL-MCNC: 17 MG/DL — SIGNIFICANT CHANGE UP (ref 7–23)
CALCIUM SERPL-MCNC: 9 MG/DL — SIGNIFICANT CHANGE UP (ref 8.4–10.5)
CHLORIDE SERPL-SCNC: 101 MMOL/L — SIGNIFICANT CHANGE UP (ref 98–107)
CHOLEST SERPL-MCNC: 152 MG/DL — SIGNIFICANT CHANGE UP
CO2 SERPL-SCNC: 21 MMOL/L — LOW (ref 22–31)
CREAT SERPL-MCNC: 0.76 MG/DL — SIGNIFICANT CHANGE UP (ref 0.5–1.3)
ESTIMATED AVERAGE GLUCOSE: 117 — SIGNIFICANT CHANGE UP
GLUCOSE SERPL-MCNC: 118 MG/DL — HIGH (ref 70–99)
HCG UR QL: NEGATIVE — SIGNIFICANT CHANGE UP
HCT VFR BLD CALC: 41.3 % — SIGNIFICANT CHANGE UP (ref 34.5–45)
HDLC SERPL-MCNC: 41 MG/DL — LOW
HGB BLD-MCNC: 14.2 G/DL — SIGNIFICANT CHANGE UP (ref 11.5–15.5)
INR BLD: 1.02 RATIO — SIGNIFICANT CHANGE UP (ref 0.88–1.16)
LIPID PNL WITH DIRECT LDL SERPL: 96 MG/DL — SIGNIFICANT CHANGE UP
MCHC RBC-ENTMCNC: 30.4 PG — SIGNIFICANT CHANGE UP (ref 27–34)
MCHC RBC-ENTMCNC: 34.4 GM/DL — SIGNIFICANT CHANGE UP (ref 32–36)
MCV RBC AUTO: 88.4 FL — SIGNIFICANT CHANGE UP (ref 80–100)
NON HDL CHOLESTEROL: 111 MG/DL — SIGNIFICANT CHANGE UP
NRBC # BLD: 0 /100 WBCS — SIGNIFICANT CHANGE UP
NRBC # FLD: 0 K/UL — SIGNIFICANT CHANGE UP
PLATELET # BLD AUTO: 260 K/UL — SIGNIFICANT CHANGE UP (ref 150–400)
POTASSIUM SERPL-MCNC: 4 MMOL/L — SIGNIFICANT CHANGE UP (ref 3.5–5.3)
POTASSIUM SERPL-SCNC: 4 MMOL/L — SIGNIFICANT CHANGE UP (ref 3.5–5.3)
PROT SERPL-MCNC: 6.6 G/DL — SIGNIFICANT CHANGE UP (ref 6–8.3)
PROTHROM AB SERPL-ACNC: 11.7 SEC — SIGNIFICANT CHANGE UP (ref 10.6–13.6)
RBC # BLD: 4.67 M/UL — SIGNIFICANT CHANGE UP (ref 3.8–5.2)
RBC # FLD: 13.2 % — SIGNIFICANT CHANGE UP (ref 10.3–14.5)
SODIUM SERPL-SCNC: 136 MMOL/L — SIGNIFICANT CHANGE UP (ref 135–145)
TRIGL SERPL-MCNC: 75 MG/DL — SIGNIFICANT CHANGE UP
WBC # BLD: 10.87 K/UL — HIGH (ref 3.8–10.5)
WBC # FLD AUTO: 10.87 K/UL — HIGH (ref 3.8–10.5)

## 2021-09-01 PROCEDURE — 37243 VASC EMBOLIZE/OCCLUDE ORGAN: CPT

## 2021-09-01 PROCEDURE — 93010 ELECTROCARDIOGRAM REPORT: CPT | Mod: 76

## 2021-09-01 PROCEDURE — 76937 US GUIDE VASCULAR ACCESS: CPT | Mod: 26

## 2021-09-01 PROCEDURE — 99223 1ST HOSP IP/OBS HIGH 75: CPT

## 2021-09-01 PROCEDURE — 36247 INS CATH ABD/L-EXT ART 3RD: CPT

## 2021-09-01 RX ORDER — SODIUM CHLORIDE 9 MG/ML
1000 INJECTION, SOLUTION INTRAVENOUS
Refills: 0 | Status: DISCONTINUED | OUTPATIENT
Start: 2021-09-01 | End: 2021-09-02

## 2021-09-01 RX ORDER — HYDROMORPHONE HYDROCHLORIDE 2 MG/ML
0.5 INJECTION INTRAMUSCULAR; INTRAVENOUS; SUBCUTANEOUS
Refills: 0 | Status: DISCONTINUED | OUTPATIENT
Start: 2021-09-01 | End: 2021-09-02

## 2021-09-01 RX ORDER — LANOLIN ALCOHOL/MO/W.PET/CERES
3 CREAM (GRAM) TOPICAL AT BEDTIME
Refills: 0 | Status: DISCONTINUED | OUTPATIENT
Start: 2021-09-01 | End: 2021-09-02

## 2021-09-01 RX ORDER — ACETAMINOPHEN 500 MG
650 TABLET ORAL EVERY 6 HOURS
Refills: 0 | Status: DISCONTINUED | OUTPATIENT
Start: 2021-09-01 | End: 2021-09-02

## 2021-09-01 RX ORDER — MAGNESIUM SULFATE 500 MG/ML
2 VIAL (ML) INJECTION ONCE
Refills: 0 | Status: COMPLETED | OUTPATIENT
Start: 2021-09-01 | End: 2021-09-01

## 2021-09-01 RX ORDER — CIPROFLOXACIN LACTATE 400MG/40ML
500 VIAL (ML) INTRAVENOUS EVERY 12 HOURS
Refills: 0 | Status: DISCONTINUED | OUTPATIENT
Start: 2021-09-01 | End: 2021-09-02

## 2021-09-01 RX ORDER — KETOROLAC TROMETHAMINE 30 MG/ML
15 SYRINGE (ML) INJECTION EVERY 6 HOURS
Refills: 0 | Status: COMPLETED | OUTPATIENT
Start: 2021-09-01 | End: 2021-09-02

## 2021-09-01 RX ORDER — ONDANSETRON 8 MG/1
4 TABLET, FILM COATED ORAL EVERY 6 HOURS
Refills: 0 | Status: DISCONTINUED | OUTPATIENT
Start: 2021-09-01 | End: 2021-09-02

## 2021-09-01 RX ORDER — NALOXONE HYDROCHLORIDE 4 MG/.1ML
0.1 SPRAY NASAL
Refills: 0 | Status: DISCONTINUED | OUTPATIENT
Start: 2021-09-01 | End: 2021-09-02

## 2021-09-01 RX ORDER — METOPROLOL TARTRATE 50 MG
50 TABLET ORAL DAILY
Refills: 0 | Status: DISCONTINUED | OUTPATIENT
Start: 2021-09-01 | End: 2021-09-02

## 2021-09-01 RX ORDER — HYDROMORPHONE HYDROCHLORIDE 2 MG/ML
30 INJECTION INTRAMUSCULAR; INTRAVENOUS; SUBCUTANEOUS
Refills: 0 | Status: DISCONTINUED | OUTPATIENT
Start: 2021-09-01 | End: 2021-09-02

## 2021-09-01 RX ORDER — LOSARTAN POTASSIUM 100 MG/1
100 TABLET, FILM COATED ORAL DAILY
Refills: 0 | Status: DISCONTINUED | OUTPATIENT
Start: 2021-09-01 | End: 2021-09-02

## 2021-09-01 RX ADMIN — HYDROMORPHONE HYDROCHLORIDE 30 MILLILITER(S): 2 INJECTION INTRAMUSCULAR; INTRAVENOUS; SUBCUTANEOUS at 11:25

## 2021-09-01 RX ADMIN — SODIUM CHLORIDE 50 MILLILITER(S): 9 INJECTION, SOLUTION INTRAVENOUS at 11:25

## 2021-09-01 RX ADMIN — HYDROMORPHONE HYDROCHLORIDE 30 MILLILITER(S): 2 INJECTION INTRAMUSCULAR; INTRAVENOUS; SUBCUTANEOUS at 17:46

## 2021-09-01 RX ADMIN — HYDROMORPHONE HYDROCHLORIDE 30 MILLILITER(S): 2 INJECTION INTRAMUSCULAR; INTRAVENOUS; SUBCUTANEOUS at 19:30

## 2021-09-01 RX ADMIN — Medication 500 MILLIGRAM(S): at 22:36

## 2021-09-01 RX ADMIN — SODIUM CHLORIDE 50 MILLILITER(S): 9 INJECTION, SOLUTION INTRAVENOUS at 19:32

## 2021-09-01 RX ADMIN — Medication 15 MILLIGRAM(S): at 15:47

## 2021-09-01 RX ADMIN — Medication 50 GRAM(S): at 14:28

## 2021-09-01 NOTE — H&P ADULT - HISTORY OF PRESENT ILLNESS
47 years old female with Obesity, "irregular rhythm " and HTN, s/p uterine artery embolization , being seen post op.  Patient was seen by cardiology pre-op , started on Toprol for the " irregular rhythm " , DANNA 2 score is 1.  Patient feels pressure in lower abdomen , wants to get up, telemetry at bedside, occasional bigeminy rhythm, see is on BB.

## 2021-09-01 NOTE — H&P ADULT - PROBLEM SELECTOR PLAN 3
Patient follows with cardiology outpatient , she was cleared by cardiology for procedure  Patient was started on Toprol XL 50 mg  check EKG

## 2021-09-01 NOTE — H&P ADULT - NSHPPHYSICALEXAM_GEN_ALL_CORE
.  VITAL SIGNS:  T(C): --  T(F): --  HR: --  BP: --  BP(mean): --  RR: --  SpO2: --  Wt(kg): --    PHYSICAL EXAM:    Constitutional: WDWN resting comfortably in bed; NAD  Head: NC/AT  Eyes: PERRL, EOMI, clear conjunctiva  ENT: no nasal discharge; uvula midline, no oropharyngeal erythema or exudates; MMM  Neck: supple; no JVD or thyromegaly  Respiratory: CTA B/L; no W/R/R, no retractions  Cardiac: +S1/S2; RRR; no M/R/G; PMI non-displaced  Gastrointestinal: soft, NT/ND; no rebound or guarding; +BSx4  Genitourinary: normal external genitalia  Back: spine midline, no bony tenderness or step-offs; no CVAT B/L  Extremities: WWP, no clubbing or cyanosis; no peripheral edema  Musculoskeletal: NROM x4; no joint swelling, tenderness or erythema  Vascular: 2+ radial, femoral, DP/PT pulses B/L  Dermatologic: skin warm, dry and intact; no rashes, wounds, or scars  Lymphatic: no submandibular or cervical LAD  Neurologic: AAOx3; CNII-XII grossly intact; no focal deficits  Psychiatric: affect and characteristics of appearance, verbalizations, behaviors are appropriate

## 2021-09-01 NOTE — H&P ADULT - NSICDXPASTMEDICALHX_GEN_ALL_CORE_FT
PAST MEDICAL HISTORY:  Choledocholithiasis     Cholelithiasis     Elevated liver function tests     Irregular heart rhythm     Mild HTN     Obesity     Uterine fibroid July 2021

## 2021-09-02 ENCOUNTER — TRANSCRIPTION ENCOUNTER (OUTPATIENT)
Age: 46
End: 2021-09-02

## 2021-09-02 VITALS
HEART RATE: 64 BPM | SYSTOLIC BLOOD PRESSURE: 118 MMHG | TEMPERATURE: 98 F | RESPIRATION RATE: 16 BRPM | DIASTOLIC BLOOD PRESSURE: 72 MMHG | OXYGEN SATURATION: 100 %

## 2021-09-02 DIAGNOSIS — R68.89 OTHER GENERAL SYMPTOMS AND SIGNS: ICD-10-CM

## 2021-09-02 PROBLEM — I10 ESSENTIAL (PRIMARY) HYPERTENSION: Chronic | Status: ACTIVE | Noted: 2021-09-01

## 2021-09-02 LAB
ANION GAP SERPL CALC-SCNC: 15 MMOL/L — HIGH (ref 7–14)
APTT BLD: 32.6 SEC — SIGNIFICANT CHANGE UP (ref 27–36.3)
BUN SERPL-MCNC: 17 MG/DL — SIGNIFICANT CHANGE UP (ref 7–23)
CALCIUM SERPL-MCNC: 8.9 MG/DL — SIGNIFICANT CHANGE UP (ref 8.4–10.5)
CHLORIDE SERPL-SCNC: 98 MMOL/L — SIGNIFICANT CHANGE UP (ref 98–107)
CO2 SERPL-SCNC: 22 MMOL/L — SIGNIFICANT CHANGE UP (ref 22–31)
COVID-19 SPIKE DOMAIN AB INTERP: POSITIVE
COVID-19 SPIKE DOMAIN ANTIBODY RESULT: >250 U/ML — HIGH
CREAT SERPL-MCNC: 0.79 MG/DL — SIGNIFICANT CHANGE UP (ref 0.5–1.3)
GLUCOSE SERPL-MCNC: 108 MG/DL — HIGH (ref 70–99)
HCT VFR BLD CALC: 40.4 % — SIGNIFICANT CHANGE UP (ref 34.5–45)
HGB BLD-MCNC: 13.8 G/DL — SIGNIFICANT CHANGE UP (ref 11.5–15.5)
MAGNESIUM SERPL-MCNC: 2.1 MG/DL — SIGNIFICANT CHANGE UP (ref 1.6–2.6)
MCHC RBC-ENTMCNC: 30.2 PG — SIGNIFICANT CHANGE UP (ref 27–34)
MCHC RBC-ENTMCNC: 34.2 GM/DL — SIGNIFICANT CHANGE UP (ref 32–36)
MCV RBC AUTO: 88.4 FL — SIGNIFICANT CHANGE UP (ref 80–100)
NRBC # BLD: 0 /100 WBCS — SIGNIFICANT CHANGE UP
NRBC # FLD: 0 K/UL — SIGNIFICANT CHANGE UP
PHOSPHATE SERPL-MCNC: 3.6 MG/DL — SIGNIFICANT CHANGE UP (ref 2.5–4.5)
PLATELET # BLD AUTO: 205 K/UL — SIGNIFICANT CHANGE UP (ref 150–400)
POTASSIUM SERPL-MCNC: 3.5 MMOL/L — SIGNIFICANT CHANGE UP (ref 3.5–5.3)
POTASSIUM SERPL-SCNC: 3.5 MMOL/L — SIGNIFICANT CHANGE UP (ref 3.5–5.3)
RBC # BLD: 4.57 M/UL — SIGNIFICANT CHANGE UP (ref 3.8–5.2)
RBC # FLD: 13.3 % — SIGNIFICANT CHANGE UP (ref 10.3–14.5)
SARS-COV-2 IGG+IGM SERPL QL IA: >250 U/ML — HIGH
SARS-COV-2 IGG+IGM SERPL QL IA: POSITIVE
SODIUM SERPL-SCNC: 135 MMOL/L — SIGNIFICANT CHANGE UP (ref 135–145)
WBC # BLD: 9.36 K/UL — SIGNIFICANT CHANGE UP (ref 3.8–10.5)
WBC # FLD AUTO: 9.36 K/UL — SIGNIFICANT CHANGE UP (ref 3.8–10.5)

## 2021-09-02 PROCEDURE — 99231 SBSQ HOSP IP/OBS SF/LOW 25: CPT

## 2021-09-02 PROCEDURE — 93010 ELECTROCARDIOGRAM REPORT: CPT | Mod: 77

## 2021-09-02 PROCEDURE — 99239 HOSP IP/OBS DSCHRG MGMT >30: CPT

## 2021-09-02 RX ORDER — CIPROFLOXACIN LACTATE 400MG/40ML
1 VIAL (ML) INTRAVENOUS
Qty: 1 | Refills: 0
Start: 2021-09-02 | End: 2021-09-02

## 2021-09-02 RX ORDER — OXYCODONE HYDROCHLORIDE 5 MG/1
5 TABLET ORAL EVERY 4 HOURS
Refills: 0 | Status: DISCONTINUED | OUTPATIENT
Start: 2021-09-02 | End: 2021-09-02

## 2021-09-02 RX ORDER — ACETAMINOPHEN 500 MG
650 TABLET ORAL EVERY 6 HOURS
Refills: 0 | Status: DISCONTINUED | OUTPATIENT
Start: 2021-09-02 | End: 2021-09-02

## 2021-09-02 RX ORDER — POTASSIUM CHLORIDE 20 MEQ
40 PACKET (EA) ORAL ONCE
Refills: 0 | Status: COMPLETED | OUTPATIENT
Start: 2021-09-02 | End: 2021-09-02

## 2021-09-02 RX ADMIN — Medication 40 MILLIEQUIVALENT(S): at 09:57

## 2021-09-02 RX ADMIN — Medication 15 MILLIGRAM(S): at 10:29

## 2021-09-02 RX ADMIN — HYDROMORPHONE HYDROCHLORIDE 30 MILLILITER(S): 2 INJECTION INTRAMUSCULAR; INTRAVENOUS; SUBCUTANEOUS at 07:32

## 2021-09-02 RX ADMIN — Medication 50 MILLIGRAM(S): at 04:11

## 2021-09-02 RX ADMIN — Medication 15 MILLIGRAM(S): at 04:12

## 2021-09-02 RX ADMIN — SODIUM CHLORIDE 50 MILLILITER(S): 9 INJECTION, SOLUTION INTRAVENOUS at 07:30

## 2021-09-02 RX ADMIN — Medication 500 MILLIGRAM(S): at 10:29

## 2021-09-02 RX ADMIN — LOSARTAN POTASSIUM 100 MILLIGRAM(S): 100 TABLET, FILM COATED ORAL at 04:11

## 2021-09-02 NOTE — DISCHARGE NOTE PROVIDER - NSDCFUSCHEDAPPT_GEN_ALL_CORE_FT
MURIEL WILLIAMSON ; 09/29/2021 ; NPP OB/GYN Surg [address]  MURIEL WILLIAMSON ; 09/30/2021 ; NP Rad -[address]

## 2021-09-02 NOTE — PROGRESS NOTE ADULT - SUBJECTIVE AND OBJECTIVE BOX
s/p UAE on 9/1/21  Pt. doing well post procedure  slight pelvic cramping  no N/V      Vital Signs Last 24 Hrs  T(C): 36.8 (02 Sep 2021 07:30), Max: 37 (02 Sep 2021 03:30)  T(F): 98.3 (02 Sep 2021 07:30), Max: 98.6 (02 Sep 2021 03:30)  HR: 60 (02 Sep 2021 07:30) (60 - 86)  BP: 105/59 (02 Sep 2021 07:30) (105/59 - 144/66)  BP(mean): --  RR: 16 (02 Sep 2021 07:30) (16 - 19)  SpO2: 100% (02 Sep 2021 07:30) (99% - 100%)    Focused Exam findings:    General: NAD  Abdomen: soft, NT, ND  R groin access site c/d/i  no hematoma        LABS:                        13.8   9.36  )-----------( 205      ( 02 Sep 2021 05:15 )             40.4     09-02    135  |  98  |  17  ----------------------------<  108<H>  3.5   |  22  |  0.79    Ca    8.9      02 Sep 2021 05:15  Phos  3.6     09-02  Mg     2.10     09-02    TPro  6.6  /  Alb  4.1  /  TBili  0.4  /  DBili  x   /  AST  17  /  ALT  24  /  AlkPhos  63  09-01    I&O's Detail    01 Sep 2021 07:01  -  02 Sep 2021 07:00  --------------------------------------------------------  IN:    Lactated Ringers: 700 mL    Oral Fluid: 730 mL  Total IN: 1430 mL    OUT:    Voided (mL): 540 mL  Total OUT: 540 mL    Total NET: 890 mL          
Anesthesia Pain Management Service    SUBJECTIVE: Pt now off IV PCA without problems reported.    Therapy:	  [ X] IV PCA	   [ ] Epidural           [ ] s/p Spinal Opoid              [ ] Postpartum infusion	  [ ] Patient controlled regional anesthesia (PCRA)    [ ] prn Analgesics    Allergies    amoxicillin (Other)  penicillin (Other)    Intolerances      MEDICATIONS  (STANDING):  acetaminophen   Tablet .. 650 milliGRAM(s) Oral every 6 hours  ciprofloxacin     Tablet 500 milliGRAM(s) Oral every 12 hours  ketorolac   Injectable 15 milliGRAM(s) IV Push every 6 hours  lactated ringers. 1000 milliLiter(s) (50 mL/Hr) IV Continuous <Continuous>  losartan 100 milliGRAM(s) Oral daily  metoprolol succinate ER 50 milliGRAM(s) Oral daily    MEDICATIONS  (PRN):  aluminum hydroxide/magnesium hydroxide/simethicone Suspension 30 milliLiter(s) Oral every 4 hours PRN Dyspepsia  melatonin 3 milliGRAM(s) Oral at bedtime PRN Insomnia  naloxone Injectable 0.1 milliGRAM(s) IV Push every 3 minutes PRN For ANY of the following changes in patient status:  A. RR LESS THAN 10 breaths per minute, B. Oxygen saturation LESS THAN 90%, C. Sedation score of 6  ondansetron Injectable 4 milliGRAM(s) IV Push every 6 hours PRN Nausea  oxyCODONE    IR 5 milliGRAM(s) Oral every 4 hours PRN Moderate to Severe Pain (4 - 10)      OBJECTIVE:   [X] No new signs     [ ] Other:    Side Effects:  [X ] None			[ ] Other:    Assessment of Catheter Site:		[ ] Intact		[ ] Other:    ASSESSMENT/PLAN  [ ] Continue current therapy    [X ] Therapy changed to:    [ ] IV PCA       [ ] Epidural     [ X] prn Analgesics     Comments: PRN Oral/IV opioids and/or non-opioid adjuvant analgesics to be used at this point.    
ANESTHESIA POSTOP CHECK    46y Female POSTOP DAY 1      Vital Signs Last 24 Hrs  T(C): 36.4 (02 Sep 2021 13:45), Max: 37 (02 Sep 2021 03:30)  T(F): 97.6 (02 Sep 2021 13:45), Max: 98.6 (02 Sep 2021 03:30)  HR: 64 (02 Sep 2021 13:45) (59 - 86)  BP: 118/72 (02 Sep 2021 13:45) (105/59 - 144/66)  RR: 16 (02 Sep 2021 13:45) (16 - 19)  SpO2: 100% (02 Sep 2021 13:45) (98% - 100%)        [x ] NO APPARENT ANESTHESIA COMPLICATIONS      Comments: 
Anesthesia Pain Management Service    SUBJECTIVE: Patient is doing well with IV PCA and no significant problems reported.  Patient has some cramping by her groin.    Pain Scale Score	At rest: _2/10__ 	With Activity: ___ 	[X ] Refer to charted pain scores    THERAPY:    [ ] IV PCA Morphine		[ ] 5 mg/mL	[ ] 1 mg/mL  [X ] IV PCA Hydromorphone	[ ] 5 mg/mL	[X ] 1 mg/mL  [ ] IV PCA Fentanyl		[ ] 50 micrograms/mL    Demand dose __0.2_ lockout __6_ (minutes) Continuous Rate _0__ Total: __0.4_   mg used (in past 24 hrs)      MEDICATIONS  (STANDING):  acetaminophen   Tablet .. 650 milliGRAM(s) Oral every 6 hours  ciprofloxacin     Tablet 500 milliGRAM(s) Oral every 12 hours  ketorolac   Injectable 15 milliGRAM(s) IV Push every 6 hours  lactated ringers. 1000 milliLiter(s) (50 mL/Hr) IV Continuous <Continuous>  losartan 100 milliGRAM(s) Oral daily  metoprolol succinate ER 50 milliGRAM(s) Oral daily    MEDICATIONS  (PRN):  aluminum hydroxide/magnesium hydroxide/simethicone Suspension 30 milliLiter(s) Oral every 4 hours PRN Dyspepsia  melatonin 3 milliGRAM(s) Oral at bedtime PRN Insomnia  naloxone Injectable 0.1 milliGRAM(s) IV Push every 3 minutes PRN For ANY of the following changes in patient status:  A. RR LESS THAN 10 breaths per minute, B. Oxygen saturation LESS THAN 90%, C. Sedation score of 6  ondansetron Injectable 4 milliGRAM(s) IV Push every 6 hours PRN Nausea  oxyCODONE    IR 5 milliGRAM(s) Oral every 4 hours PRN Moderate to Severe Pain (4 - 10)      OBJECTIVE:  Patient is standing by the bedside.     Sedation Score:	[ X] Alert	[ ] Drowsy 	[ ] Arousable	[ ] Asleep	[ ] Unresponsive    Side Effects:	[X ] None	[ ] Nausea	[ ] Vomiting	[ ] Pruritus  		[ ] Other:    Vital Signs Last 24 Hrs  T(C): 36.8 (02 Sep 2021 07:30), Max: 37 (02 Sep 2021 03:30)  T(F): 98.3 (02 Sep 2021 07:30), Max: 98.6 (02 Sep 2021 03:30)  HR: 60 (02 Sep 2021 07:30) (60 - 86)  BP: 105/59 (02 Sep 2021 07:30) (105/59 - 144/66)  BP(mean): --  RR: 16 (02 Sep 2021 07:30) (16 - 19)  SpO2: 100% (02 Sep 2021 07:30) (99% - 100%)    ASSESSMENT/ PLAN    Therapy to  be:	[ ] Continue   [ X] Discontinued   [X ] Change to prn Analgesics    Documentation and Verification of current medications:   [X] Done	[ ] Not done, not elligible    Comments: IV Dilaudid PCA discontinued. PRN Oral/IV opioids and/or Adjuvant non-opioid medication to be ordered at this point.    
Patient is feeling better, pain is controlled.  HBa1C is 5.7 discussed with patient.    .  VITAL SIGNS:  T(C): 36.4 (09-02-21 @ 12:07), Max: 37 (09-02-21 @ 03:30)  T(F): 97.6 (09-02-21 @ 12:07), Max: 98.6 (09-02-21 @ 03:30)  HR: 59 (09-02-21 @ 12:07) (59 - 86)  BP: 110/68 (09-02-21 @ 12:07) (105/59 - 144/66)  BP(mean): --  RR: 18 (09-02-21 @ 12:07) (16 - 19)  SpO2: 98% (09-02-21 @ 12:07) (98% - 100%)  Wt(kg): --    PHYSICAL EXAM:    Constitutional: WDWN resting comfortably in bed; NAD  Head: NC/AT  Eyes: PERRL, EOMI, clear conjunctiva  ENT: no nasal discharge; uvula midline, no oropharyngeal erythema or exudates; MMM  Neck: supple; no JVD or thyromegaly  Respiratory: CTA B/L; no W/R/R, no retractions  Cardiac: +S1/S2; RRR; no M/R/G; PMI non-displaced  Gastrointestinal: soft, NT/ND; no rebound or guarding; +BSx4  Genitourinary: normal external genitalia  Back: spine midline, no bony tenderness or step-offs; no CVAT B/L  Extremities: WWP, no clubbing or cyanosis; no peripheral edema  Musculoskeletal: NROM x4; no joint swelling, tenderness or erythema  Vascular: 2+ radial, femoral, DP/PT pulses B/L  Dermatologic: skin warm, dry and intact; no rashes, wounds, or scars  Lymphatic: no submandibular or cervical LAD  Neurologic: AAOx3; CNII-XII grossly intact; no focal deficits  Psychiatric: affect and characteristics of appearance, verbalizations, behaviors are appropriate    .  LABS:                         13.8   9.36  )-----------( 205      ( 02 Sep 2021 05:15 )             40.4     09-02    135  |  98  |  17  ----------------------------<  108<H>  3.5   |  22  |  0.79    Ca    8.9      02 Sep 2021 05:15  Phos  3.6     09-02  Mg     2.10     09-02    TPro  6.6  /  Alb  4.1  /  TBili  0.4  /  DBili  x   /  AST  17  /  ALT  24  /  AlkPhos  63  09-01    PT/INR - ( 01 Sep 2021 18:05 )   PT: 11.7 sec;   INR: 1.02 ratio         PTT - ( 02 Sep 2021 05:15 )  PTT:32.6 sec              RADIOLOGY, EKG & ADDITIONAL TESTS: Reviewed.

## 2021-09-02 NOTE — DISCHARGE NOTE PROVIDER - CARE PROVIDERS DIRECT ADDRESSES
,DirectAddress_[email or web address]

## 2021-09-02 NOTE — DISCHARGE NOTE PROVIDER - PROVIDER TOKENS
PROVIDER:[TOKEN:[[id number]],SCHEDULEDAPPT:[09/30/2021],SCHEDULEDAPPTTIME:[10:00 AM]],PROVIDER:[TOKEN:[[id number]],SCHEDULEDAPPT:[09/29/2021],SCHEDULEDAPPTTIME:[07:30 AM],ESTABLISHEDPATIENT:[T]]

## 2021-09-02 NOTE — DISCHARGE NOTE PROVIDER - HOSPITAL COURSE
47 years old female with Obesity, "irregular rhythm" and HTN now s/p uterine artery embolization 9/1. Patient without further complains of pain and is medically stable for discharge with outpatient follow up. On 9/1 this case was reviewed with Dr. Tian, the patient is medically stable and optimized for discharge. All medications were reviewed and prescriptions were sent to mutually agreed upon pharmacy.

## 2021-09-02 NOTE — PROGRESS NOTE ADULT - PROBLEM SELECTOR PLAN 1
Pre-diabetes , HB A1C is 5.7  Patient was counselled regarding weight loss, diet/exercise/follow up PCP

## 2021-09-02 NOTE — PROGRESS NOTE ADULT - NSPROGADDITIONALINFOA_GEN_ALL_CORE
Patient is stable for DC, d/w PA.  Med reconciliation reviewed.  Time spent evaluating patient and  coordinating care <45 min

## 2021-09-02 NOTE — DISCHARGE NOTE NURSING/CASE MANAGEMENT/SOCIAL WORK - PATIENT PORTAL LINK FT
You can access the FollowMyHealth Patient Portal offered by [facility] by registering at the following website: [email or web address]. By joining [employer]’s FollowMyHealth portal, you will also be able to view your health information using other applications (apps) compatible with our system.

## 2021-09-02 NOTE — PROGRESS NOTE ADULT - ASSESSMENT
46 year old female with symptomatic fibroids s/p UAE on 0/1/21    Plan:  Patient cleared by IR for d/c home  take Cipro 500mg Q12h x 10days  take Ibuprofen 400mg q6h x 3-4 days   Perocet 5/325mg q4-6h prn  if taking Percocet, also take Colace and Senna to prevent constipation  drink plenty of water   no bath x 5days, she can shower  no heavy lifting x 1 week  F/U appt on 9/30/21 at 10am with Dr. Bardales ([facility])   Pt. given info.  
47 years old female with Obesity, "irregular rhythm " and HTN, s/p uterine artery embolization , being seen post op.  Patient was seen by cardiology pre-op , started on Toprol for the " irregular rhythm " , DANNA 2 score is 1.  Patient feels pressure in lower abdomen , wants to get up, telemetry at bedside, occasional bigeminy rhythm, see is on BB.

## 2021-09-02 NOTE — DISCHARGE NOTE PROVIDER - NSDCCPCAREPLAN_GEN_ALL_CORE_FT
PRINCIPAL DISCHARGE DIAGNOSIS  Diagnosis: Uterine fibroid  Assessment and Plan of Treatment: You underwent a uterine artery embolization on 9/1 with Dr. Bardales (Interventional Radiology). Follow up with Dr. Bardales and your outpatient GYN for further monitoring within 1 month.  ** Take your last dose of antibiotics tonight with dinner.

## 2021-09-08 DIAGNOSIS — M79.671 PAIN IN RIGHT FOOT: ICD-10-CM

## 2021-09-09 ENCOUNTER — APPOINTMENT (OUTPATIENT)
Dept: ULTRASOUND IMAGING | Facility: CLINIC | Age: 46
End: 2021-09-09
Payer: COMMERCIAL

## 2021-09-09 ENCOUNTER — OUTPATIENT (OUTPATIENT)
Dept: OUTPATIENT SERVICES | Facility: HOSPITAL | Age: 46
LOS: 1 days | End: 2021-09-09
Payer: COMMERCIAL

## 2021-09-09 DIAGNOSIS — Z87.19 PERSONAL HISTORY OF OTHER DISEASES OF THE DIGESTIVE SYSTEM: Chronic | ICD-10-CM

## 2021-09-09 DIAGNOSIS — Z98.89 OTHER SPECIFIED POSTPROCEDURAL STATES: Chronic | ICD-10-CM

## 2021-09-09 DIAGNOSIS — Z00.8 ENCOUNTER FOR OTHER GENERAL EXAMINATION: ICD-10-CM

## 2021-09-09 DIAGNOSIS — D21.9 BENIGN NEOPLASM OF CONNECTIVE AND OTHER SOFT TISSUE, UNSPECIFIED: ICD-10-CM

## 2021-09-09 DIAGNOSIS — M79.671 PAIN IN RIGHT FOOT: ICD-10-CM

## 2021-09-09 PROCEDURE — 93971 EXTREMITY STUDY: CPT

## 2021-09-09 PROCEDURE — 93971 EXTREMITY STUDY: CPT | Mod: 26,RT

## 2021-09-10 ENCOUNTER — TRANSCRIPTION ENCOUNTER (OUTPATIENT)
Age: 46
End: 2021-09-10

## 2021-09-14 ENCOUNTER — TRANSCRIPTION ENCOUNTER (OUTPATIENT)
Age: 46
End: 2021-09-14

## 2021-09-15 ENCOUNTER — OUTPATIENT (OUTPATIENT)
Dept: OUTPATIENT SERVICES | Facility: HOSPITAL | Age: 46
LOS: 1 days | End: 2021-09-15
Payer: COMMERCIAL

## 2021-09-15 VITALS
WEIGHT: 287.92 LBS | DIASTOLIC BLOOD PRESSURE: 80 MMHG | TEMPERATURE: 98 F | RESPIRATION RATE: 16 BRPM | HEIGHT: 67 IN | OXYGEN SATURATION: 98 % | SYSTOLIC BLOOD PRESSURE: 110 MMHG | HEART RATE: 94 BPM

## 2021-09-15 DIAGNOSIS — Z86.79 PERSONAL HISTORY OF OTHER DISEASES OF THE CIRCULATORY SYSTEM: Chronic | ICD-10-CM

## 2021-09-15 DIAGNOSIS — R73.03 PREDIABETES: ICD-10-CM

## 2021-09-15 DIAGNOSIS — Z98.89 OTHER SPECIFIED POSTPROCEDURAL STATES: Chronic | ICD-10-CM

## 2021-09-15 DIAGNOSIS — I49.9 CARDIAC ARRHYTHMIA, UNSPECIFIED: ICD-10-CM

## 2021-09-15 DIAGNOSIS — Z86.39 PERSONAL HISTORY OF OTHER ENDOCRINE, NUTRITIONAL AND METABOLIC DISEASE: ICD-10-CM

## 2021-09-15 DIAGNOSIS — Z87.19 PERSONAL HISTORY OF OTHER DISEASES OF THE DIGESTIVE SYSTEM: Chronic | ICD-10-CM

## 2021-09-15 DIAGNOSIS — N84.0 POLYP OF CORPUS UTERI: ICD-10-CM

## 2021-09-15 DIAGNOSIS — D21.9 BENIGN NEOPLASM OF CONNECTIVE AND OTHER SOFT TISSUE, UNSPECIFIED: Chronic | ICD-10-CM

## 2021-09-15 LAB
A1C WITH ESTIMATED AVERAGE GLUCOSE RESULT: 5.5 % — SIGNIFICANT CHANGE UP (ref 4–5.6)
ALBUMIN SERPL ELPH-MCNC: 4.4 G/DL — SIGNIFICANT CHANGE UP (ref 3.3–5)
ALP SERPL-CCNC: 66 U/L — SIGNIFICANT CHANGE UP (ref 40–120)
ALT FLD-CCNC: 19 U/L — SIGNIFICANT CHANGE UP (ref 4–33)
ANION GAP SERPL CALC-SCNC: 14 MMOL/L — SIGNIFICANT CHANGE UP (ref 7–14)
AST SERPL-CCNC: 18 U/L — SIGNIFICANT CHANGE UP (ref 4–32)
BILIRUB SERPL-MCNC: 0.4 MG/DL — SIGNIFICANT CHANGE UP (ref 0.2–1.2)
BUN SERPL-MCNC: 16 MG/DL — SIGNIFICANT CHANGE UP (ref 7–23)
CALCIUM SERPL-MCNC: 9.3 MG/DL — SIGNIFICANT CHANGE UP (ref 8.4–10.5)
CHLORIDE SERPL-SCNC: 104 MMOL/L — SIGNIFICANT CHANGE UP (ref 98–107)
CO2 SERPL-SCNC: 22 MMOL/L — SIGNIFICANT CHANGE UP (ref 22–31)
CREAT SERPL-MCNC: 0.71 MG/DL — SIGNIFICANT CHANGE UP (ref 0.5–1.3)
ESTIMATED AVERAGE GLUCOSE: 111 — SIGNIFICANT CHANGE UP
GLUCOSE SERPL-MCNC: 102 MG/DL — HIGH (ref 70–99)
HCG SERPL-ACNC: <5 MIU/ML — SIGNIFICANT CHANGE UP
HCT VFR BLD CALC: 40.5 % — SIGNIFICANT CHANGE UP (ref 34.5–45)
HGB BLD-MCNC: 14.3 G/DL — SIGNIFICANT CHANGE UP (ref 11.5–15.5)
MCHC RBC-ENTMCNC: 30.3 PG — SIGNIFICANT CHANGE UP (ref 27–34)
MCHC RBC-ENTMCNC: 35.3 GM/DL — SIGNIFICANT CHANGE UP (ref 32–36)
MCV RBC AUTO: 85.8 FL — SIGNIFICANT CHANGE UP (ref 80–100)
NRBC # BLD: 0 /100 WBCS — SIGNIFICANT CHANGE UP
NRBC # FLD: 0 K/UL — SIGNIFICANT CHANGE UP
PLATELET # BLD AUTO: 283 K/UL — SIGNIFICANT CHANGE UP (ref 150–400)
POTASSIUM SERPL-MCNC: 3.1 MMOL/L — LOW (ref 3.5–5.3)
POTASSIUM SERPL-SCNC: 3.1 MMOL/L — LOW (ref 3.5–5.3)
PROT SERPL-MCNC: 7 G/DL — SIGNIFICANT CHANGE UP (ref 6–8.3)
RBC # BLD: 4.72 M/UL — SIGNIFICANT CHANGE UP (ref 3.8–5.2)
RBC # FLD: 12.3 % — SIGNIFICANT CHANGE UP (ref 10.3–14.5)
SODIUM SERPL-SCNC: 140 MMOL/L — SIGNIFICANT CHANGE UP (ref 135–145)
WBC # BLD: 9.8 K/UL — SIGNIFICANT CHANGE UP (ref 3.8–10.5)
WBC # FLD AUTO: 9.8 K/UL — SIGNIFICANT CHANGE UP (ref 3.8–10.5)

## 2021-09-15 PROCEDURE — 93010 ELECTROCARDIOGRAM REPORT: CPT

## 2021-09-15 RX ORDER — SODIUM CHLORIDE 9 MG/ML
1000 INJECTION, SOLUTION INTRAVENOUS
Refills: 0 | Status: DISCONTINUED | OUTPATIENT
Start: 2021-09-15 | End: 2021-09-30

## 2021-09-15 NOTE — H&P PST ADULT - PROBLEM SELECTOR PLAN 2
EKG done     Pt to Dr Magaña for pre op cardiac evaluation    Pt denies cp, denies palpitations, denies sob ; vss; pt in nad

## 2021-09-15 NOTE — H&P PST ADULT - NSICDXPASTMEDICALHX_GEN_ALL_CORE_FT
PAST MEDICAL HISTORY:  Choledocholithiasis s/p Cholecystectomy    Cholelithiasis     Elevated liver function tests     Irregular heart rhythm     Mild HTN     Obesity     Uterine fibroid July 2021 s/p UFE 9/01/21     PAST MEDICAL HISTORY:  Borderline diabetes     Choledocholithiasis s/p Cholecystectomy    Cholelithiasis     Elevated liver function tests     Irregular heart rhythm per pt seen by pcp 8/21 ; metoprolol rx    Lower back pain Pt reports " herniated discs " tx Physical therapy ; pt denies at present ; tx epidural last > 2 years ago    Mild HTN     Obesity     Uterine fibroid July 2021 s/p UFE 9/01/21

## 2021-09-15 NOTE — H&P PST ADULT - HEALTH CARE MAINTENANCE
has been vaccinated -- copy of vaccination card in chart has been vaccinated -- Moderna  1/20/21 , 2/22/21  Flu shot 9/15/21

## 2021-09-15 NOTE — H&P PST ADULT - NEGATIVE BREAST SYMPTOMS
no breast tenderness L/no breast tenderness R no breast tenderness L/no breast tenderness R/no nipple discharge L/no nipple discharge R

## 2021-09-15 NOTE — H&P PST ADULT - NSICDXPASTSURGICALHX_GEN_ALL_CORE_FT
PAST SURGICAL HISTORY:  H/O cholelithiasis s/p cholecystectomy in ? 2015    H/O: varicose veins s/p Vein stripping 2018    S/P ERCP 7/8/2015 - with placement of biliary stent--patient not sure if she had a stent inserted     PAST SURGICAL HISTORY:  Fibroids s/p UFE 9/01/21    H/O cholelithiasis s/p cholecystectomy in ? 2015    H/O: varicose veins s/p Vein stripping 2018    S/P ERCP 7/8/2015 - with placement of biliary stent--patient not sure if she had a stent inserted

## 2021-09-15 NOTE — H&P PST ADULT - PROBLEM SELECTOR PLAN 1
Dilation Curettage Hysteroscopy , Polypectomy with Myosure     Pre op instructions reviewed with pt ; pt verbalized good understanding of pre op instructions  \  Cup provided for UCG dos    Pt aware Covid testing to be scheduled pre op

## 2021-09-15 NOTE — H&P PST ADULT - NS PRO LAST MENSTRUAL DATE
July 13, 2021; sent urine pregnancy; given urine cup to bring specimen the am of surgery pt reports vaginal bleeding x 2 days 9/02

## 2021-09-15 NOTE — H&P PST ADULT - CVS HE PE MLT D E PC
S1 S 2; irregular heart beat; PVCs on PAST ekg/no rub/no murmur S1 S 2; irregular heart beat; PVCs/no rub/no murmur

## 2021-09-15 NOTE — H&P PST ADULT - NEGATIVE GENERAL GENITOURINARY SYMPTOMS
no hematuria/no renal colic/no bladder infections no hematuria/no renal colic/no bladder infections/no dysuria

## 2021-09-15 NOTE — H&P PST ADULT - NEGATIVE NEUROLOGICAL SYMPTOMS
no cva/no generalized seizures/no focal seizures/no headache no cva/no weakness/no paresthesias/no generalized seizures/no focal seizures/no headache

## 2021-09-15 NOTE — H&P PST ADULT - HISTORY OF PRESENT ILLNESS
This is a 47 y/o female ; 11/20  ; routine GYN exam  with h/o  uterine fibroid ; per s/p sonogram s/p UFE 9/01/21  Pts reports h/o polyp ; pt now presents for Dilation Curettage Hysteroscopy Polypectomy with Myosure  This is a 45 y/o female ; pt to GYN ; s/p sonogram ; per pt  " there is a Polyp "   ; pt now presents for Dilation Curettage Hysteroscopy Polypectomy with Myosure       Pt reports h/o Fibroids ; s/o UFE 9/01/21

## 2021-09-15 NOTE — H&P PST ADULT - PROBLEM/PLAN-3
SICU Staff Addendum--> Please link to resident progress note from 9/12/2020  I have reviewed and concur with the resident's history, physical, assessment, and plan.  I have personally interviewed and examined the patient at bedside.  See below for any additional findings.    Reason for admission:  Acute respiratory failure with hypoxia  Present on Admission:   (Resolved) SOB (shortness of breath)   Acute respiratory failure with hypoxia   Severe mitral regurgitation   Left atrial thrombus   Paroxysmal atrial fibrillation   Essential hypertension   Other hyperlipidemia   Coronary artery disease involving native coronary artery of native heart without angina pectoris   Acute on chronic diastolic (congestive) heart failure      Goals for Today:   - Milrinone discontinued to worsening of cardiac index and decreased urine output; filling pressures elevated  - Dobutamine added this morning for additional inotropy to assist with diuresis; aim to transition from epi to  only  - hx of atrial fibrillation, if tachyarrhythmias arise recommend milrinone over dobutamine; resume home amiodarone  - Paroxysmal atrial fibrillation during SICU course; will need to resume anticoagulation in near future  - Iatrogenic respiratory alkalosis; decrease vent rate to 12  - I discussed all the above explicitly with Dr. Waddell    40 minutes of critical care time was spent personally by me on the following activities: development of treatment plan with patient or surrogate and bedside caregivers, discussions with consultants, evaluation of patient's response to treatment, examination of patient, ordering and performing treatments and interventions, ordering and review of laboratory studies, ordering and review of radiographic studies, pulse oximetry, re-evaluation of patient's condition.  This critical care time did not overlap with that of any other provider or involve time for any procedures.    Clement Terrazas MD  Anesthesia  Critical Care  Spectra [phone]     DISPLAY PLAN FREE TEXT

## 2021-09-15 NOTE — H&P PST ADULT - NEGATIVE ENMT SYMPTOMS
no hearing difficulty/no nasal congestion no hearing difficulty/no ear pain/no tinnitus/no nasal congestion

## 2021-09-15 NOTE — H&P PST ADULT - NEGATIVE CARDIOVASCULAR SYMPTOMS
no chest pain/no palpitations/no dyspnea on exertion in pst/no chest pain/no palpitations/no dyspnea on exertion

## 2021-09-22 ENCOUNTER — NON-APPOINTMENT (OUTPATIENT)
Age: 46
End: 2021-09-22

## 2021-09-26 ENCOUNTER — APPOINTMENT (OUTPATIENT)
Dept: DISASTER EMERGENCY | Facility: CLINIC | Age: 46
End: 2021-09-26

## 2021-09-26 DIAGNOSIS — Z01.818 ENCOUNTER FOR OTHER PREPROCEDURAL EXAMINATION: ICD-10-CM

## 2021-09-27 LAB — SARS-COV-2 N GENE NPH QL NAA+PROBE: NOT DETECTED

## 2021-09-28 ENCOUNTER — TRANSCRIPTION ENCOUNTER (OUTPATIENT)
Age: 46
End: 2021-09-28

## 2021-09-29 ENCOUNTER — APPOINTMENT (OUTPATIENT)
Dept: OBGYN | Facility: HOSPITAL | Age: 46
End: 2021-09-29

## 2021-09-29 ENCOUNTER — OUTPATIENT (OUTPATIENT)
Dept: OUTPATIENT SERVICES | Facility: HOSPITAL | Age: 46
LOS: 1 days | Discharge: HOME | End: 2021-09-29
Payer: COMMERCIAL

## 2021-09-29 ENCOUNTER — RESULT REVIEW (OUTPATIENT)
Age: 46
End: 2021-09-29

## 2021-09-29 VITALS
HEART RATE: 51 BPM | TEMPERATURE: 98 F | RESPIRATION RATE: 16 BRPM | HEIGHT: 67 IN | OXYGEN SATURATION: 97 % | SYSTOLIC BLOOD PRESSURE: 130 MMHG | DIASTOLIC BLOOD PRESSURE: 75 MMHG | WEIGHT: 287.92 LBS

## 2021-09-29 VITALS
RESPIRATION RATE: 15 BRPM | DIASTOLIC BLOOD PRESSURE: 71 MMHG | HEART RATE: 71 BPM | SYSTOLIC BLOOD PRESSURE: 115 MMHG | OXYGEN SATURATION: 96 %

## 2021-09-29 DIAGNOSIS — Z86.79 PERSONAL HISTORY OF OTHER DISEASES OF THE CIRCULATORY SYSTEM: Chronic | ICD-10-CM

## 2021-09-29 DIAGNOSIS — D21.9 BENIGN NEOPLASM OF CONNECTIVE AND OTHER SOFT TISSUE, UNSPECIFIED: Chronic | ICD-10-CM

## 2021-09-29 DIAGNOSIS — N84.0 POLYP OF CORPUS UTERI: ICD-10-CM

## 2021-09-29 DIAGNOSIS — Z87.19 PERSONAL HISTORY OF OTHER DISEASES OF THE DIGESTIVE SYSTEM: Chronic | ICD-10-CM

## 2021-09-29 DIAGNOSIS — Z98.89 OTHER SPECIFIED POSTPROCEDURAL STATES: Chronic | ICD-10-CM

## 2021-09-29 LAB — HCG UR QL: NEGATIVE — SIGNIFICANT CHANGE UP

## 2021-09-29 PROCEDURE — 58558 HYSTEROSCOPY BIOPSY: CPT

## 2021-09-29 PROCEDURE — 88305 TISSUE EXAM BY PATHOLOGIST: CPT | Mod: 26

## 2021-09-29 RX ORDER — OXYCODONE HYDROCHLORIDE 5 MG/1
10 TABLET ORAL ONCE
Refills: 0 | Status: DISCONTINUED | OUTPATIENT
Start: 2021-09-29 | End: 2021-09-29

## 2021-09-29 RX ORDER — ONDANSETRON 8 MG/1
4 TABLET, FILM COATED ORAL ONCE
Refills: 0 | Status: DISCONTINUED | OUTPATIENT
Start: 2021-09-29 | End: 2021-10-13

## 2021-09-29 RX ORDER — SODIUM CHLORIDE 9 MG/ML
1000 INJECTION, SOLUTION INTRAVENOUS
Refills: 0 | Status: DISCONTINUED | OUTPATIENT
Start: 2021-09-29 | End: 2021-10-13

## 2021-09-29 RX ORDER — FENTANYL CITRATE 50 UG/ML
50 INJECTION INTRAVENOUS
Refills: 0 | Status: DISCONTINUED | OUTPATIENT
Start: 2021-09-29 | End: 2021-09-29

## 2021-09-29 RX ORDER — OXYCODONE HYDROCHLORIDE 5 MG/1
5 TABLET ORAL ONCE
Refills: 0 | Status: DISCONTINUED | OUTPATIENT
Start: 2021-09-29 | End: 2021-09-29

## 2021-09-29 RX ORDER — FENTANYL CITRATE 50 UG/ML
25 INJECTION INTRAVENOUS
Refills: 0 | Status: DISCONTINUED | OUTPATIENT
Start: 2021-09-29 | End: 2021-09-29

## 2021-09-29 NOTE — ASU DISCHARGE PLAN (ADULT/PEDIATRIC) - MEDICATION INSTRUCTIONS
You received IV Toradol for pain management at 8:30 AM. Please DO NOT take Motrin/Ibuprofen/Advil/Aleve/NSAIDs (Non-Steroidal Anti-Inflammatory Drugs) for the next 6 hours (until 2:30 PM).

## 2021-09-29 NOTE — ASU DISCHARGE PLAN (ADULT/PEDIATRIC) - PAIN MANAGEMENT
You received IV Tylenol for pain management at 8:05 AM. Please DO NOT take any Tylenol (Acetaminophen) containing products, such as Vicodin, Percocet, Excedrin, and cold medications for the next 6 hours (until 2:05 PM). DO NOT TAKE MORE THAN 4000 MG OF TYLENOL in a 24 hour period./Take over the counter pain medication

## 2021-09-29 NOTE — ASU DISCHARGE PLAN (ADULT/PEDIATRIC) - CARE PROVIDER_API CALL
Adi Higuera)  OBSGYN  General  [facility], [address] [place] [address]  Phone: [phone]  Fax: [phone]  Follow Up Time:

## 2021-09-29 NOTE — ASU DISCHARGE PLAN (ADULT/PEDIATRIC) - ACTIVITY LEVEL
No excercise/Nothing per rectum/Nothing per vagina/No intercourse until cleared by MD/No excercise/No heavy lifting/Nothing per rectum/Nothing per vagina/No tub baths/No douching/No tampons/No intercourse

## 2021-09-29 NOTE — ASU DISCHARGE PLAN (ADULT/PEDIATRIC) - FOLLOW UP APPOINTMENTS
may also call Recovery Room (PACU) 24/7 @ [phone]/[facility], [facility]

## 2021-09-29 NOTE — ASU DISCHARGE PLAN (ADULT/PEDIATRIC) - CALL YOUR DOCTOR IF YOU HAVE ANY OF THE FOLLOWING:
Bleeding that does not stop Bleeding that does not stop/Pain not relieved by Medications/Fever greater than (need to indicate Fahrenheit or Celsius)/Wound/Surgical Site with redness, or foul smelling discharge or pus/Inability to tolerate liquids or foods

## 2021-09-30 ENCOUNTER — APPOINTMENT (OUTPATIENT)
Dept: INTERVENTIONAL RADIOLOGY/VASCULAR | Facility: CLINIC | Age: 46
End: 2021-09-30
Payer: COMMERCIAL

## 2021-09-30 VITALS — HEIGHT: 67 IN | BODY MASS INDEX: 45.04 KG/M2 | WEIGHT: 287 LBS

## 2021-09-30 DIAGNOSIS — D21.9 BENIGN NEOPLASM OF CONNECTIVE AND OTHER SOFT TISSUE, UNSPECIFIED: ICD-10-CM

## 2021-09-30 PROBLEM — M54.5 LOW BACK PAIN: Chronic | Status: ACTIVE | Noted: 2021-09-15

## 2021-09-30 PROBLEM — R73.03 PREDIABETES: Chronic | Status: ACTIVE | Noted: 2021-09-15

## 2021-09-30 PROBLEM — D25.9 LEIOMYOMA OF UTERUS, UNSPECIFIED: Chronic | Status: ACTIVE | Noted: 2021-08-06

## 2021-09-30 PROCEDURE — 99242 OFF/OP CONSLTJ NEW/EST SF 20: CPT | Mod: 95

## 2021-09-30 RX ORDER — OXYCODONE AND ACETAMINOPHEN 5; 325 MG/1; MG/1
5-325 TABLET ORAL
Qty: 6 | Refills: 0 | Status: COMPLETED | COMMUNITY
Start: 2021-09-02

## 2021-09-30 RX ORDER — CIPROFLOXACIN HYDROCHLORIDE 500 MG/1
500 TABLET, FILM COATED ORAL
Qty: 1 | Refills: 0 | Status: COMPLETED | COMMUNITY
Start: 2021-09-02

## 2021-09-30 RX ORDER — METOPROLOL SUCCINATE 50 MG/1
50 TABLET, EXTENDED RELEASE ORAL
Qty: 90 | Refills: 0 | Status: ACTIVE | COMMUNITY
Start: 2021-08-31

## 2021-09-30 RX ORDER — POTASSIUM CHLORIDE 1500 MG/1
20 TABLET, EXTENDED RELEASE ORAL
Qty: 180 | Refills: 0 | Status: ACTIVE | COMMUNITY
Start: 2021-09-16

## 2021-10-08 LAB — SURGICAL PATHOLOGY STUDY: SIGNIFICANT CHANGE UP

## 2021-10-13 ENCOUNTER — APPOINTMENT (OUTPATIENT)
Dept: OBGYN | Facility: CLINIC | Age: 46
End: 2021-10-13
Payer: COMMERCIAL

## 2021-10-13 VITALS
DIASTOLIC BLOOD PRESSURE: 72 MMHG | BODY MASS INDEX: 45.2 KG/M2 | WEIGHT: 288 LBS | TEMPERATURE: 96.4 F | HEIGHT: 67 IN | SYSTOLIC BLOOD PRESSURE: 129 MMHG | HEART RATE: 63 BPM

## 2021-10-13 PROCEDURE — 99212 OFFICE O/P EST SF 10 MIN: CPT

## 2021-10-26 PROBLEM — R10.2 PELVIC PAIN: Status: ACTIVE | Noted: 2021-05-03

## 2021-10-26 PROBLEM — R35.0 URINARY FREQUENCY: Status: ACTIVE | Noted: 2021-05-03

## 2021-10-26 PROBLEM — Z76.89 ESTABLISHING CARE WITH NEW DOCTOR, ENCOUNTER FOR: Status: ACTIVE | Noted: 2021-10-26

## 2021-10-27 ENCOUNTER — APPOINTMENT (OUTPATIENT)
Dept: GYNECOLOGIC ONCOLOGY | Facility: CLINIC | Age: 46
End: 2021-10-27
Payer: COMMERCIAL

## 2021-10-27 VITALS — DIASTOLIC BLOOD PRESSURE: 91 MMHG | HEIGHT: 67 IN | SYSTOLIC BLOOD PRESSURE: 151 MMHG

## 2021-10-27 DIAGNOSIS — R35.0 FREQUENCY OF MICTURITION: ICD-10-CM

## 2021-10-27 DIAGNOSIS — R10.2 PELVIC AND PERINEAL PAIN: ICD-10-CM

## 2021-10-27 DIAGNOSIS — Z76.89 PERSONS ENCOUNTERING HEALTH SERVICES IN OTHER SPECIFIED CIRCUMSTANCES: ICD-10-CM

## 2021-10-27 PROCEDURE — 99205 OFFICE O/P NEW HI 60 MIN: CPT

## 2021-10-27 RX ORDER — ASCORBIC ACID 500 MG
TABLET ORAL
Refills: 0 | Status: ACTIVE | COMMUNITY

## 2021-10-27 RX ORDER — CHROMIUM 200 MCG
TABLET ORAL
Refills: 0 | Status: ACTIVE | COMMUNITY

## 2021-11-01 ENCOUNTER — APPOINTMENT (OUTPATIENT)
Dept: GASTROENTEROLOGY | Facility: CLINIC | Age: 46
End: 2021-11-01
Payer: COMMERCIAL

## 2021-11-01 VITALS
WEIGHT: 290 LBS | SYSTOLIC BLOOD PRESSURE: 119 MMHG | BODY MASS INDEX: 45.52 KG/M2 | HEART RATE: 65 BPM | DIASTOLIC BLOOD PRESSURE: 72 MMHG | HEIGHT: 67 IN

## 2021-11-01 DIAGNOSIS — M51.26 OTHER INTERVERTEBRAL DISC DISPLACEMENT, LUMBAR REGION: ICD-10-CM

## 2021-11-01 DIAGNOSIS — Z82.49 FAMILY HISTORY OF ISCHEMIC HEART DISEASE AND OTHER DISEASES OF THE CIRCULATORY SYSTEM: ICD-10-CM

## 2021-11-01 DIAGNOSIS — Z12.11 ENCOUNTER FOR SCREENING FOR MALIGNANT NEOPLASM OF COLON: ICD-10-CM

## 2021-11-01 DIAGNOSIS — Z87.891 PERSONAL HISTORY OF NICOTINE DEPENDENCE: ICD-10-CM

## 2021-11-01 DIAGNOSIS — Z83.71 FAMILY HISTORY OF COLONIC POLYPS: ICD-10-CM

## 2021-11-01 DIAGNOSIS — I10 ESSENTIAL (PRIMARY) HYPERTENSION: ICD-10-CM

## 2021-11-01 DIAGNOSIS — E66.01 MORBID (SEVERE) OBESITY DUE TO EXCESS CALORIES: ICD-10-CM

## 2021-11-01 PROCEDURE — 99204 OFFICE O/P NEW MOD 45 MIN: CPT

## 2021-11-01 PROCEDURE — 82274 ASSAY TEST FOR BLOOD FECAL: CPT | Mod: QW

## 2021-11-01 NOTE — ASSESSMENT
[FreeTextEntry1] : 1.  Colon cancer screening-rule out colonic polyps.\par 2.  Complex endometrial hyperplasia with atypia and uterine fibroids-status post uterine fibroid embolization; hysterectomy is pending.\par 3.  Morbid obesity.\par 4.  Hypertension.\par 5.  Status post laparoscopic cholecystectomy; status post common bile duct stone extraction via ERCP.\par \par Plan:\par 1.  The patient was advised to schedule a colonoscopy.  The procedure, material risks (including bleeding, perforation, anesthesia-related complications, rare complications, death and risk of missing a lesion), benefits (including finding a polyp, cancer, inflammatory bowel disease or other lesions) and alternatives (including stool testing and imaging) were discussed with the patient at length.  The [employer] Brochure was given. The MiraLax preparation was advised.  Because the patient has BM > 40, the procedure will have to be performed in the hospital as an outpatient.  The patient was introduced to Dr. Dashawn Arora, who will be performing the procedure.

## 2021-11-01 NOTE — HISTORY OF PRESENT ILLNESS
[FreeTextEntry1] : This 46-year-old woman is referred for a screening colonoscopy in anticipation of planned hysterectomy for 2022.  She has complex endometrial hyperplasia with atypia and uterine fibroids.  On 2021, she had a uterine artery embolization in an attempt to shrink the fibroids.  She denies change in bowel habits, abdominal pain or visible blood in the stool.  There is no family history of colon cancer, but her mother frequently has colon polyps.  The patient has rare heartburn, but no nausea or vomiting.  She has an irregular heartbeat and hypertension, for which losartan-HCTZ was started.  She had a laparoscopic cholecystectomy and prior ERCP with common bile duct stone extraction in .  She was vaccinated against COVID-19 with the Moderna vaccine in February.  She has herniated disks in her back and arthritis of her left knee.  Her mother has hypertension and cardiomyopathy.  Her father  secondary to either heart attack or stroke, but he had hypertension.  Her brother has atrial fibrillation, and her other brother is healthy.  She is single and has no children.  She works as a .  She quit smoking 2 months ago, but used to smoke a half a pack of cigarettes a day for more than 20 years.  She drinks alcohol socially.  She is allergic to amoxicillin and penicillin.

## 2021-11-01 NOTE — REVIEW OF SYSTEMS
[Heart Rate Is Fast] : fast heart rate [Heartburn] : heartburn [Abn Vaginal Bleeding] : unexplained vaginal bleeding [Negative] : Heme/Lymph [FreeTextEntry5] : Irregular heart rate [FreeTextEntry8] : [facility] 09/29/21/ endometrial hyperplasia/fibroids [FreeTextEntry9] : Herniated disc back, arthritis in left knee

## 2021-11-01 NOTE — PHYSICAL EXAM
[General Appearance - Alert] : alert [General Appearance - In No Acute Distress] : in no acute distress [General Appearance - Well Developed] : well developed [General Appearance - Well-Appearing] : healthy appearing [Sclera] : the sclera and conjunctiva were normal [PERRL With Normal Accommodation] : pupils were equal in size, round, and reactive to light [Extraocular Movements] : extraocular movements were intact [Strabismus] : no strabismus was seen [Optic Disc Abnormality] : the optic disc were normal in size and color [Retina] : no retinal hemorrhages, vessel changes or exudates seen on fundoscopic exam [Outer Ear] : the ears and nose were normal in appearance [Examination Of The Oral Cavity] : the lips and gums were normal [Both Tympanic Membranes Were Examined] : both tympanic membranes were normal [Nasal Cavity] : the nasal mucosa and septum were normal [Oropharynx] : the oropharynx was normal [Neck Appearance] : the appearance of the neck was normal [Neck Cervical Mass (___cm)] : no neck mass was observed [Jugular Venous Distention Increased] : there was no jugular-venous distention [Thyroid Diffuse Enlargement] : the thyroid was not enlarged [Thyroid Nodule] : there were no palpable thyroid nodules [Auscultation Breath Sounds / Voice Sounds] : lungs were clear to auscultation bilaterally [Lungs Percussion] : the lungs were normal to percussion [Heart Rate And Rhythm] : heart rate was normal and rhythm regular [Heart Sounds] : normal S1 and S2 [Heart Sounds Gallop] : no gallops [Murmurs] : no murmurs [Heart Sounds Pericardial Friction Rub] : no pericardial rub [Arterial Pulses Carotid] : carotid pulses were normal with no bruits [Abdominal Aorta] : the abdominal aorta was normal [Full Pulse] : the pedal pulses are present [Edema] : there was no peripheral edema [Veins - Varicosity Changes] : there were no varicosital changes [Bowel Sounds] : normal bowel sounds [Abdomen Soft] : soft [Abdomen Tenderness] : non-tender [Abdomen Mass (___ Cm)] : no abdominal mass palpated [Abdomen Hernia] : no hernia was discovered [Normal Sphincter Tone] : normal sphincter tone [No Rectal Mass] : no rectal mass [Occult Blood Positive] : stool was negative for occult blood [Cervical Lymph Nodes Enlarged Posterior Bilaterally] : posterior cervical [Cervical Lymph Nodes Enlarged Anterior Bilaterally] : anterior cervical [Supraclavicular Lymph Nodes Enlarged Bilaterally] : supraclavicular [Femoral Lymph Nodes Enlarged Bilaterally] : femoral [Inguinal Lymph Nodes Enlarged Bilaterally] : inguinal [No CVA Tenderness] : no ~M costovertebral angle tenderness [No Spinal Tenderness] : no spinal tenderness [Abnormal Walk] : normal gait [Nail Clubbing] : no clubbing  or cyanosis of the fingernails [Involuntary Movements] : no involuntary movements were seen [Musculoskeletal - Swelling] : no joint swelling seen [Skin Turgor] : normal skin turgor [] : no rash [Skin Lesions] : no skin lesions [FreeTextEntry1] : tattoo right ankle [No Focal Deficits] : no focal deficits [Oriented To Time, Place, And Person] : oriented to person, place, and time [Impaired Insight] : insight and judgment were intact [Affect] : the affect was normal [Mood] : the mood was normal

## 2021-11-01 NOTE — CONSULT LETTER
[Dear  ___] : Dear  [unfilled], [Consult Letter:] : I had the pleasure of evaluating your patient, [unfilled]. [( Thank you for referring [unfilled] for consultation for _____ )] : Thank you for referring [unfilled] for consultation for [unfilled] [Please see my note below.] : Please see my note below. [Consult Closing:] : Thank you very much for allowing me to participate in the care of this patient.  If you have any questions, please do not hesitate to contact me. [Sincerely,] : Sincerely, [FreeTextEntry3] : Michele Campbell MD

## 2021-11-02 ENCOUNTER — TRANSCRIPTION ENCOUNTER (OUTPATIENT)
Age: 46
End: 2021-11-02

## 2021-11-08 NOTE — HISTORY OF PRESENT ILLNESS
[Pain is well-controlled] : pain is well-controlled [Fever] : no fever [Vaginal Bleeding] : no vaginal bleeding [Vaginal Discharge] : no vaginal discharge [Healed] : healed [None] : no vaginal bleeding [Normal] : normal [Pathology reviewed] : pathology reviewed

## 2021-11-12 ENCOUNTER — LABORATORY RESULT (OUTPATIENT)
Age: 46
End: 2021-11-12

## 2021-11-12 ENCOUNTER — APPOINTMENT (OUTPATIENT)
Dept: DISASTER EMERGENCY | Facility: CLINIC | Age: 46
End: 2021-11-12

## 2021-11-15 ENCOUNTER — APPOINTMENT (OUTPATIENT)
Dept: GASTROENTEROLOGY | Facility: CLINIC | Age: 46
End: 2021-11-15

## 2021-11-15 ENCOUNTER — OUTPATIENT (OUTPATIENT)
Dept: OUTPATIENT SERVICES | Facility: HOSPITAL | Age: 46
LOS: 1 days | Discharge: HOME | End: 2021-11-15
Payer: COMMERCIAL

## 2021-11-15 ENCOUNTER — APPOINTMENT (OUTPATIENT)
Dept: GASTROENTEROLOGY | Facility: HOSPITAL | Age: 46
End: 2021-11-15

## 2021-11-15 VITALS
HEART RATE: 84 BPM | RESPIRATION RATE: 18 BRPM | OXYGEN SATURATION: 97 % | DIASTOLIC BLOOD PRESSURE: 50 MMHG | SYSTOLIC BLOOD PRESSURE: 110 MMHG

## 2021-11-15 VITALS
RESPIRATION RATE: 19 BRPM | TEMPERATURE: 97 F | HEIGHT: 67 IN | OXYGEN SATURATION: 98 % | DIASTOLIC BLOOD PRESSURE: 57 MMHG | WEIGHT: 285.06 LBS | SYSTOLIC BLOOD PRESSURE: 100 MMHG | HEART RATE: 68 BPM

## 2021-11-15 DIAGNOSIS — Z86.79 PERSONAL HISTORY OF OTHER DISEASES OF THE CIRCULATORY SYSTEM: Chronic | ICD-10-CM

## 2021-11-15 DIAGNOSIS — D21.9 BENIGN NEOPLASM OF CONNECTIVE AND OTHER SOFT TISSUE, UNSPECIFIED: Chronic | ICD-10-CM

## 2021-11-15 DIAGNOSIS — Z87.19 PERSONAL HISTORY OF OTHER DISEASES OF THE DIGESTIVE SYSTEM: Chronic | ICD-10-CM

## 2021-11-15 DIAGNOSIS — Z98.89 OTHER SPECIFIED POSTPROCEDURAL STATES: Chronic | ICD-10-CM

## 2021-11-15 DIAGNOSIS — Z12.11 ENCOUNTER FOR SCREENING FOR MALIGNANT NEOPLASM OF COLON: ICD-10-CM

## 2021-11-15 PROCEDURE — 45378 DIAGNOSTIC COLONOSCOPY: CPT | Mod: 33

## 2021-11-15 NOTE — ASU PATIENT PROFILE, ADULT - ALCOHOL USE HISTORY SINGLE SELECT
LAURA Membreno is a 3 m.o. male with:  1. Large perimembranous ventricular septal defect  - left heart dilation  - pulmonary overcirculation on diuretics and afterload reduction  2. Patent foramen ovale  3. Patent ductus arteriosus  4. Trisomy 21  5. Failure to thrive  6. Poor feeding  - s/p Nissen and Gtube (2/12/19)  7. Laryngomalacia  - s/p supraglottoplasty (2/12/19)  8. Influenza B, increased WOB  Neuro:  - No issues  - Tylenol PRN  Resp:  - On HFNC 6 Lpm/21%. Wean as tolerated.   - CPT  CV:  - Continue IV Lasix for today. Likely transition to PO tomorrow.   - Continue Captopril 0.2 mg PO TID  - Last echo 3/4/19 without concern.   FEN/GI:  - Will restart feeds today. Bolus during the day and continuous at night of Neosure 26 kcal/oz + 2.5 ml MCT oil BID.   - Maintain GI prophylaxis s/p supraglottoplasty through 3/12.  - Electrolytes stable.   Renal:  - No concerns.   Heme/ID:  - Continue Tamiflu x 5 days.   - Continue Rocephin for concerns of consolidation in right lung.   Dispo:  - Monitor in ICU   never

## 2021-11-22 ENCOUNTER — APPOINTMENT (OUTPATIENT)
Dept: OBGYN | Facility: CLINIC | Age: 46
End: 2021-11-22

## 2021-12-21 ENCOUNTER — OUTPATIENT (OUTPATIENT)
Dept: OUTPATIENT SERVICES | Facility: HOSPITAL | Age: 46
LOS: 1 days | End: 2021-12-21
Payer: COMMERCIAL

## 2021-12-21 VITALS
RESPIRATION RATE: 16 BRPM | SYSTOLIC BLOOD PRESSURE: 130 MMHG | WEIGHT: 289.91 LBS | TEMPERATURE: 97 F | OXYGEN SATURATION: 99 % | HEART RATE: 73 BPM | HEIGHT: 67 IN | DIASTOLIC BLOOD PRESSURE: 78 MMHG

## 2021-12-21 DIAGNOSIS — D21.9 BENIGN NEOPLASM OF CONNECTIVE AND OTHER SOFT TISSUE, UNSPECIFIED: Chronic | ICD-10-CM

## 2021-12-21 DIAGNOSIS — R94.31 ABNORMAL ELECTROCARDIOGRAM [ECG] [EKG]: ICD-10-CM

## 2021-12-21 DIAGNOSIS — Z98.89 OTHER SPECIFIED POSTPROCEDURAL STATES: Chronic | ICD-10-CM

## 2021-12-21 DIAGNOSIS — I10 ESSENTIAL (PRIMARY) HYPERTENSION: ICD-10-CM

## 2021-12-21 DIAGNOSIS — Z87.19 PERSONAL HISTORY OF OTHER DISEASES OF THE DIGESTIVE SYSTEM: Chronic | ICD-10-CM

## 2021-12-21 DIAGNOSIS — N85.02 ENDOMETRIAL INTRAEPITHELIAL NEOPLASIA [EIN]: ICD-10-CM

## 2021-12-21 DIAGNOSIS — N98.9 COMPLICATION ASSOCIATED WITH ARTIFICIAL FERTILIZATION, UNSPECIFIED: ICD-10-CM

## 2021-12-21 DIAGNOSIS — E66.01 MORBID (SEVERE) OBESITY DUE TO EXCESS CALORIES: ICD-10-CM

## 2021-12-21 DIAGNOSIS — Z86.79 PERSONAL HISTORY OF OTHER DISEASES OF THE CIRCULATORY SYSTEM: Chronic | ICD-10-CM

## 2021-12-21 LAB
A1C WITH ESTIMATED AVERAGE GLUCOSE RESULT: 5.4 % — SIGNIFICANT CHANGE UP (ref 4–5.6)
ALBUMIN SERPL ELPH-MCNC: 4.6 G/DL — SIGNIFICANT CHANGE UP (ref 3.3–5)
ALP SERPL-CCNC: 70 U/L — SIGNIFICANT CHANGE UP (ref 40–120)
ALT FLD-CCNC: 17 U/L — SIGNIFICANT CHANGE UP (ref 4–33)
ANION GAP SERPL CALC-SCNC: 14 MMOL/L — SIGNIFICANT CHANGE UP (ref 7–14)
AST SERPL-CCNC: 13 U/L — SIGNIFICANT CHANGE UP (ref 4–32)
BILIRUB SERPL-MCNC: 0.3 MG/DL — SIGNIFICANT CHANGE UP (ref 0.2–1.2)
BLD GP AB SCN SERPL QL: NEGATIVE — SIGNIFICANT CHANGE UP
BUN SERPL-MCNC: 10 MG/DL — SIGNIFICANT CHANGE UP (ref 7–23)
CALCIUM SERPL-MCNC: 9.8 MG/DL — SIGNIFICANT CHANGE UP (ref 8.4–10.5)
CHLORIDE SERPL-SCNC: 102 MMOL/L — SIGNIFICANT CHANGE UP (ref 98–107)
CO2 SERPL-SCNC: 23 MMOL/L — SIGNIFICANT CHANGE UP (ref 22–31)
CREAT SERPL-MCNC: 0.69 MG/DL — SIGNIFICANT CHANGE UP (ref 0.5–1.3)
ESTIMATED AVERAGE GLUCOSE: 108 — SIGNIFICANT CHANGE UP
GLUCOSE SERPL-MCNC: 77 MG/DL — SIGNIFICANT CHANGE UP (ref 70–99)
HCG UR QL: NEGATIVE — SIGNIFICANT CHANGE UP
HCT VFR BLD CALC: 44.5 % — SIGNIFICANT CHANGE UP (ref 34.5–45)
HGB BLD-MCNC: 15 G/DL — SIGNIFICANT CHANGE UP (ref 11.5–15.5)
MCHC RBC-ENTMCNC: 30.3 PG — SIGNIFICANT CHANGE UP (ref 27–34)
MCHC RBC-ENTMCNC: 33.7 GM/DL — SIGNIFICANT CHANGE UP (ref 32–36)
MCV RBC AUTO: 89.9 FL — SIGNIFICANT CHANGE UP (ref 80–100)
NRBC # BLD: 0 /100 WBCS — SIGNIFICANT CHANGE UP
NRBC # FLD: 0 K/UL — SIGNIFICANT CHANGE UP
PLATELET # BLD AUTO: 266 K/UL — SIGNIFICANT CHANGE UP (ref 150–400)
POTASSIUM SERPL-MCNC: 3.9 MMOL/L — SIGNIFICANT CHANGE UP (ref 3.5–5.3)
POTASSIUM SERPL-SCNC: 3.9 MMOL/L — SIGNIFICANT CHANGE UP (ref 3.5–5.3)
PROT SERPL-MCNC: 7.1 G/DL — SIGNIFICANT CHANGE UP (ref 6–8.3)
RBC # BLD: 4.95 M/UL — SIGNIFICANT CHANGE UP (ref 3.8–5.2)
RBC # FLD: 12.8 % — SIGNIFICANT CHANGE UP (ref 10.3–14.5)
RH IG SCN BLD-IMP: POSITIVE — SIGNIFICANT CHANGE UP
SODIUM SERPL-SCNC: 139 MMOL/L — SIGNIFICANT CHANGE UP (ref 135–145)
WBC # BLD: 8.76 K/UL — SIGNIFICANT CHANGE UP (ref 3.8–10.5)
WBC # FLD AUTO: 8.76 K/UL — SIGNIFICANT CHANGE UP (ref 3.8–10.5)

## 2021-12-21 PROCEDURE — 93010 ELECTROCARDIOGRAM REPORT: CPT

## 2021-12-21 NOTE — H&P PST ADULT - NSICDXPASTSURGICALHX_GEN_ALL_CORE_FT
PAST SURGICAL HISTORY:  Fibroids s/p UFE 9/01/21    H/O cholelithiasis s/p cholecystectomy in ? 2015    H/O: varicose veins s/p Vein stripping 2018    S/P ERCP 7/8/2015 - with placement of biliary stent--patient not sure if she had a stent inserted

## 2021-12-21 NOTE — H&P PST ADULT - REASON FOR ADMISSION
Patient did a portal message yesterday 6/27/19, and she is calling to see if  receive the message. Patient mention she is out of Medication,    1. (Zpac) 200 mg, 6 pack tablet    Please refill the med if appropriate    " I have a polyp " "Hysterectomy"

## 2021-12-21 NOTE — H&P PST ADULT - PROBLEM SELECTOR PLAN 4
Pt reports she takes Losartan Hctz in am.  Pt advised to discuss  Losartan without Hctz x1 dose for morning of surgery.

## 2021-12-21 NOTE — H&P PST ADULT - PROBLEM SELECTOR PLAN 1
Exploratory Laparotomy MUSA, bilateral salpingectomy, possible bilateral BSO.    CBC CMP T&S Hgba1C UCG EKG    Preop instructions and antibacterial soap given and explained (verbal and written), with teach back.

## 2021-12-21 NOTE — H&P PST ADULT - MUSCULOSKELETAL COMMENTS
intermittent  lower back pain, radiates to Left leg. dx with sciatica, per pt intermittent  lower back pain, radiates to Left leg, dx with sciatica, per pt

## 2021-12-21 NOTE — H&P PST ADULT - NEGATIVE CARDIOVASCULAR SYMPTOMS
no chest pain/no palpitations/no dyspnea on exertion denies dizziness, no hx of syncope/no chest pain/no dyspnea on exertion

## 2021-12-21 NOTE — H&P PST ADULT - CARDIOVASCULAR COMMENTS
Pt was scheduled for  uterine fibroid emobolization  surgery 9/2021; EKG done at [facility] showed frequent PVC's   Pt was evaluated by Dr. Magaña, PMD/ Cardiologist and had work-up including echo and halter monitor.  Halter showed bigeminy events  and trigeminy event .  EKG at PST 12/21/21 showed trigeminy.  I spoke with Dr. Magaña and informed of above.  Pt advised to see Dr. Magaña for her scheduled pre-op clearance appt 12/30/21. Pt was scheduled for  uterine fibroid emobolization  surgery 9/2021; EKG done at [facility] showed frequent PVC's.   Pt was evaluated by Dr. Magaña, PMD/ Cardiologist and had work-up including echo and halter monitor.  Halter showed bigeminy events  and trigeminy event .  EKG at PST 12/21/21 showed bigeminy.  I spoke with Dr. Magaña and informed of above.  Pt advised to see Dr. Magaña for her pre-op clearance,  appt 12/30/21. Pt was scheduled for  uterine fibroid emobolization  surgery 9/2021; EKG done at [facility] showed frequent PVC's.   Pt was evaluated by Dr. Magaña, PMD/ Cardiologist and had work-up including echo and halter monitor.  Halter showed bigeminy events  and trigeminy event.  Pt reports she was started on Metoprolol at that time.  EKG at [facility] 12/21/21 showed bigeminy.  I spoke with Dr. Magaña and informed of above.  Pt advised to see Dr. Magaña for her pre-op clearance,  appt 12/30/21.

## 2021-12-21 NOTE — H&P PST ADULT - HISTORY OF PRESENT ILLNESS
47 y/o female with hx of uterine fibroids, s/o uterine fibroid embolization 9/2021.  Poly was discovered, biopsy showed endometrial hyperplasia with atypical cells per pt.  Scheduled for Exploratory Laparotomy MUSA, bilateral salpingectomy, possible bilateral BSO 45 y/o female with hx of uterine fibroids, s/p uterine fibroid embolization 9/2021.  Polyp was discovered, biopsy showed endometrial hyperplasia with atypical cells per pt.  Scheduled for Exploratory Laparotomy MUSA, bilateral salpingectomy, possible bilateral BSO. 47 y/o female with hx of uterine fibroids, s/p uterine fibroid embolization 9/2021.  Polyp was discovered, biopsy showed endometrial intraepithelial neoplasia. Scheduled for Exploratory Laparotomy MUSA, bilateral salpingectomy, possible bilateral BSO.

## 2021-12-21 NOTE — H&P PST ADULT - PROBLEM SELECTOR PLAN 3
Pt was scheduled for  uterine fibroid emobolization  surgery 9/2021; EKG done at [facility] showed frequent PVC's.   Pt was evaluated by Dr. Magaña, PMD/ Cardiologist and had work-up including echo and halter monitor.  Halter showed bigeminy events  and trigeminy event.  EKG at PST 12/21/21 showed bigeminy.  I spoke with Dr. Magaña and informed of above.  Pt advised to see Dr. Magaña for her pre-op clearance,  appt 12/30/21. EKG faxed to Dr. Magaña's office

## 2021-12-21 NOTE — H&P PST ADULT - NSANTHOSAYNRD_GEN_A_CORE
No. SVIAN screening performed.  STOP BANG Legend: 0-2 = LOW Risk; 3-4 = INTERMEDIATE Risk; 5-8 = HIGH Risk

## 2021-12-21 NOTE — H&P PST ADULT - NSICDXPASTMEDICALHX_GEN_ALL_CORE_FT
PAST MEDICAL HISTORY:  Borderline diabetes     Choledocholithiasis s/p Cholecystectomy    Cholelithiasis     Elevated liver function tests     History of sciatica     Irregular heart rhythm per pt seen by pcp 8/21 ; metoprolol rx    Lower back pain Pt reports " herniated discs " tx Physical therapy ; pt denies at present ; tx epidural last > 2 years ago    Mild HTN     Obesity     Uterine fibroid July 2021 s/p UFE 9/01/21     PAST MEDICAL HISTORY:  Borderline diabetes     Choledocholithiasis s/p Cholecystectomy    Cholelithiasis     Elevated liver function tests     Endometrial intraepithelial neoplasia (EIN)     History of sciatica     Irregular heart rhythm     Lower back pain Pt reports " herniated discs " tx Physical therapy ; pt denies at present ; tx epidural last > 2 years ago    Mild HTN     Morbid obesity     Obesity     Uterine fibroid July 2021 s/p UFE 9/01/21

## 2021-12-21 NOTE — H&P PST ADULT - ATTENDING COMMENTS
plan for exlap/tahBS, possible BSO, all indicated procedures, discussed risks including bleeding, infection, injury to bowel/bladder/vessels/nerves/ureters, risks of blood transfusion, reoperation, ICU admission  possible lymph node staging

## 2022-01-06 ENCOUNTER — TRANSCRIPTION ENCOUNTER (OUTPATIENT)
Age: 47
End: 2022-01-06

## 2022-01-06 NOTE — ASU PATIENT PROFILE, ADULT - NSICDXPASTMEDICALHX_GEN_ALL_CORE_FT
PAST MEDICAL HISTORY:  Borderline diabetes     Choledocholithiasis s/p Cholecystectomy    Cholelithiasis     Elevated liver function tests     Endometrial intraepithelial neoplasia (EIN)     History of sciatica     Irregular heart rhythm     Lower back pain Pt reports " herniated discs " tx Physical therapy ; pt denies at present ; tx epidural last > 2 years ago    Mild HTN     Morbid obesity     Obesity     Uterine fibroid July 2021 s/p UFE 9/01/21

## 2022-01-06 NOTE — ASU PATIENT PROFILE, ADULT - FALL HARM RISK - UNIVERSAL INTERVENTIONS
Bed in lowest position, wheels locked, appropriate side rails in place/Call bell, personal items and telephone in reach/Instruct patient to call for assistance before getting out of bed or chair/Non-slip footwear when patient is out of bed/[place] to call system/Physically safe environment - no spills, clutter or unnecessary equipment/Purposeful Proactive Rounding/Room/bathroom lighting operational, light cord in reach

## 2022-01-06 NOTE — ASU PATIENT PROFILE, ADULT - NS TRANSFER PATIENT BELONGINGS
Cell Phone/PDA (specify)/Clothing Locker/Cell Phone/PDA (specify)/Clothing Locker # 14/Cell Phone/PDA (specify)/Clothing

## 2022-01-07 ENCOUNTER — TRANSCRIPTION ENCOUNTER (OUTPATIENT)
Age: 47
End: 2022-01-07

## 2022-01-07 ENCOUNTER — APPOINTMENT (OUTPATIENT)
Dept: GYNECOLOGIC ONCOLOGY | Facility: HOSPITAL | Age: 47
End: 2022-01-07

## 2022-01-07 ENCOUNTER — INPATIENT (INPATIENT)
Facility: HOSPITAL | Age: 47
LOS: 1 days | Discharge: HOME | End: 2022-01-09
Attending: OBSTETRICS & GYNECOLOGY | Admitting: OBSTETRICS & GYNECOLOGY
Payer: COMMERCIAL

## 2022-01-07 ENCOUNTER — RESULT REVIEW (OUTPATIENT)
Age: 47
End: 2022-01-07

## 2022-01-07 VITALS
TEMPERATURE: 98 F | WEIGHT: 289.91 LBS | OXYGEN SATURATION: 97 % | RESPIRATION RATE: 16 BRPM | DIASTOLIC BLOOD PRESSURE: 63 MMHG | SYSTOLIC BLOOD PRESSURE: 108 MMHG | HEART RATE: 55 BPM | HEIGHT: 67 IN

## 2022-01-07 DIAGNOSIS — D21.9 BENIGN NEOPLASM OF CONNECTIVE AND OTHER SOFT TISSUE, UNSPECIFIED: Chronic | ICD-10-CM

## 2022-01-07 DIAGNOSIS — N98.9 COMPLICATION ASSOCIATED WITH ARTIFICIAL FERTILIZATION, UNSPECIFIED: ICD-10-CM

## 2022-01-07 DIAGNOSIS — Z86.79 PERSONAL HISTORY OF OTHER DISEASES OF THE CIRCULATORY SYSTEM: Chronic | ICD-10-CM

## 2022-01-07 DIAGNOSIS — Z98.89 OTHER SPECIFIED POSTPROCEDURAL STATES: Chronic | ICD-10-CM

## 2022-01-07 DIAGNOSIS — Z87.19 PERSONAL HISTORY OF OTHER DISEASES OF THE DIGESTIVE SYSTEM: Chronic | ICD-10-CM

## 2022-01-07 LAB
GLUCOSE BLDC GLUCOMTR-MCNC: 126 MG/DL — HIGH (ref 70–99)
GLUCOSE BLDC GLUCOMTR-MCNC: 128 MG/DL — HIGH (ref 70–99)
HCG UR QL: NEGATIVE — SIGNIFICANT CHANGE UP

## 2022-01-07 PROCEDURE — 88307 TISSUE EXAM BY PATHOLOGIST: CPT | Mod: 26

## 2022-01-07 PROCEDURE — 88331 PATH CONSLTJ SURG 1 BLK 1SPC: CPT | Mod: 26

## 2022-01-07 PROCEDURE — 58150 TOTAL HYSTERECTOMY: CPT

## 2022-01-07 PROCEDURE — 88305 TISSUE EXAM BY PATHOLOGIST: CPT | Mod: 26

## 2022-01-07 PROCEDURE — 88112 CYTOPATH CELL ENHANCE TECH: CPT | Mod: 26

## 2022-01-07 RX ORDER — SODIUM CHLORIDE 9 MG/ML
1000 INJECTION, SOLUTION INTRAVENOUS
Refills: 0 | Status: DISCONTINUED | OUTPATIENT
Start: 2022-01-07 | End: 2022-01-08

## 2022-01-07 RX ORDER — HYDROMORPHONE HYDROCHLORIDE 2 MG/ML
30 INJECTION INTRAMUSCULAR; INTRAVENOUS; SUBCUTANEOUS
Refills: 0 | Status: DISCONTINUED | OUTPATIENT
Start: 2022-01-07 | End: 2022-01-08

## 2022-01-07 RX ORDER — ONDANSETRON 8 MG/1
4 TABLET, FILM COATED ORAL EVERY 4 HOURS
Refills: 0 | Status: DISCONTINUED | OUTPATIENT
Start: 2022-01-07 | End: 2022-01-09

## 2022-01-07 RX ORDER — OXYCODONE HYDROCHLORIDE 5 MG/1
1 TABLET ORAL
Qty: 6 | Refills: 0
Start: 2022-01-07

## 2022-01-07 RX ORDER — HYDROMORPHONE HYDROCHLORIDE 2 MG/ML
0.5 INJECTION INTRAMUSCULAR; INTRAVENOUS; SUBCUTANEOUS
Refills: 0 | Status: DISCONTINUED | OUTPATIENT
Start: 2022-01-07 | End: 2022-01-08

## 2022-01-07 RX ORDER — SODIUM CHLORIDE 9 MG/ML
1000 INJECTION, SOLUTION INTRAVENOUS
Refills: 0 | Status: DISCONTINUED | OUTPATIENT
Start: 2022-01-07 | End: 2022-01-07

## 2022-01-07 RX ORDER — HEPARIN SODIUM 5000 [USP'U]/ML
5000 INJECTION INTRAVENOUS; SUBCUTANEOUS EVERY 8 HOURS
Refills: 0 | Status: DISCONTINUED | OUTPATIENT
Start: 2022-01-07 | End: 2022-01-08

## 2022-01-07 RX ORDER — ASCORBIC ACID 60 MG
1 TABLET,CHEWABLE ORAL
Qty: 0 | Refills: 0 | DISCHARGE

## 2022-01-07 RX ORDER — LOSARTAN/HYDROCHLOROTHIAZIDE 100MG-25MG
1 TABLET ORAL
Qty: 0 | Refills: 0 | DISCHARGE

## 2022-01-07 RX ORDER — ACETAMINOPHEN 500 MG
1000 TABLET ORAL ONCE
Refills: 0 | Status: COMPLETED | OUTPATIENT
Start: 2022-01-07 | End: 2022-01-07

## 2022-01-07 RX ORDER — CHLORHEXIDINE GLUCONATE 213 G/1000ML
1 SOLUTION TOPICAL DAILY
Refills: 0 | Status: DISCONTINUED | OUTPATIENT
Start: 2022-01-07 | End: 2022-01-07

## 2022-01-07 RX ORDER — ACETAMINOPHEN 500 MG
1000 TABLET ORAL ONCE
Refills: 0 | Status: DISCONTINUED | OUTPATIENT
Start: 2022-01-08 | End: 2022-01-08

## 2022-01-07 RX ORDER — NALOXONE HYDROCHLORIDE 4 MG/.1ML
0.1 SPRAY NASAL
Refills: 0 | Status: DISCONTINUED | OUTPATIENT
Start: 2022-01-07 | End: 2022-01-09

## 2022-01-07 RX ORDER — CHOLECALCIFEROL (VITAMIN D3) 125 MCG
1 CAPSULE ORAL
Qty: 0 | Refills: 0 | DISCHARGE

## 2022-01-07 RX ORDER — POTASSIUM CHLORIDE 20 MEQ
1 PACKET (EA) ORAL
Qty: 0 | Refills: 0 | DISCHARGE

## 2022-01-07 RX ORDER — KETOROLAC TROMETHAMINE 30 MG/ML
30 SYRINGE (ML) INJECTION EVERY 8 HOURS
Refills: 0 | Status: DISCONTINUED | OUTPATIENT
Start: 2022-01-07 | End: 2022-01-09

## 2022-01-07 RX ORDER — SIMETHICONE 80 MG/1
80 TABLET, CHEWABLE ORAL EVERY 6 HOURS
Refills: 0 | Status: DISCONTINUED | OUTPATIENT
Start: 2022-01-07 | End: 2022-01-09

## 2022-01-07 RX ORDER — METOPROLOL TARTRATE 50 MG
1 TABLET ORAL
Qty: 0 | Refills: 0 | DISCHARGE

## 2022-01-07 RX ORDER — ACETAMINOPHEN 500 MG
1000 TABLET ORAL ONCE
Refills: 0 | Status: COMPLETED | OUTPATIENT
Start: 2022-01-08 | End: 2022-01-08

## 2022-01-07 RX ORDER — ONDANSETRON 8 MG/1
4 TABLET, FILM COATED ORAL EVERY 6 HOURS
Refills: 0 | Status: DISCONTINUED | OUTPATIENT
Start: 2022-01-07 | End: 2022-01-08

## 2022-01-07 RX ORDER — METOPROLOL TARTRATE 50 MG
5 TABLET ORAL EVERY 6 HOURS
Refills: 0 | Status: DISCONTINUED | OUTPATIENT
Start: 2022-01-07 | End: 2022-01-08

## 2022-01-07 RX ADMIN — SIMETHICONE 80 MILLIGRAM(S): 80 TABLET, CHEWABLE ORAL at 20:13

## 2022-01-07 RX ADMIN — Medication 400 MILLIGRAM(S): at 15:30

## 2022-01-07 RX ADMIN — Medication 30 MILLIGRAM(S): at 13:37

## 2022-01-07 RX ADMIN — SIMETHICONE 80 MILLIGRAM(S): 80 TABLET, CHEWABLE ORAL at 13:37

## 2022-01-07 RX ADMIN — HEPARIN SODIUM 5000 UNIT(S): 5000 INJECTION INTRAVENOUS; SUBCUTANEOUS at 19:37

## 2022-01-07 RX ADMIN — HYDROMORPHONE HYDROCHLORIDE 0.5 MILLIGRAM(S): 2 INJECTION INTRAMUSCULAR; INTRAVENOUS; SUBCUTANEOUS at 13:10

## 2022-01-07 RX ADMIN — SODIUM CHLORIDE 150 MILLILITER(S): 9 INJECTION, SOLUTION INTRAVENOUS at 15:30

## 2022-01-07 RX ADMIN — HYDROMORPHONE HYDROCHLORIDE 30 MILLILITER(S): 2 INJECTION INTRAMUSCULAR; INTRAVENOUS; SUBCUTANEOUS at 17:12

## 2022-01-07 RX ADMIN — HYDROMORPHONE HYDROCHLORIDE 0.5 MILLIGRAM(S): 2 INJECTION INTRAMUSCULAR; INTRAVENOUS; SUBCUTANEOUS at 13:30

## 2022-01-07 RX ADMIN — Medication 400 MILLIGRAM(S): at 21:20

## 2022-01-07 RX ADMIN — HYDROMORPHONE HYDROCHLORIDE 30 MILLILITER(S): 2 INJECTION INTRAMUSCULAR; INTRAVENOUS; SUBCUTANEOUS at 20:44

## 2022-01-07 NOTE — DISCHARGE NOTE PROVIDER - NSDCCPCAREPLAN_GEN_ALL_CORE_FT
PRINCIPAL DISCHARGE DIAGNOSIS  Diagnosis: Endometrial intraepithelial neoplasia (EIN)  Assessment and Plan of Treatment:        PRINCIPAL DISCHARGE DIAGNOSIS  Diagnosis: Endometrial intraepithelial neoplasia (EIN)  Assessment and Plan of Treatment: s/p ExLap, MUSA, BS,. LO, pelvic washings

## 2022-01-07 NOTE — DISCHARGE NOTE PROVIDER - HOSPITAL COURSE
The patient was admitted for a scheduled surgery and underwent ex-lap, MUSA, and left oophorectomy. Frozen was benign. The surgery was uncomplicated.  Please see brief operative note for more details. Patient was OOB on POD#0 (?).     The martinez was removed on POD1 and the patient voided. Post op CBC was appropriate. On POD1, the patient's morning labs and vitals were stable. On POD#_____ she was discharged. On the day of discharge, she was tolerating a regular diet, ambulating, voiding, and pain was well controlled. She was discharged in a stable condition.  This 45 yo female s/p ExLap, MUSA, bilateral salpingectomy and left oophorectomy with frozen section benign, (see operative note for details of procedure). Pt was extubated in the OR and transferred to PACU in a hemodynamically stable condition with PCA for pain control, SQ Heparin for DVT prophylaxis.  Pt had a low urine output overnight; she was given a bolus of fluid, however the Gray catheter had to be replaced at beside due to possible kink in tube. After that, Gray draining adequately.  Pt also had an episode of desaturation noted on pulse oximetry. Pt was place on 2LNC overnight with improvement of oxygenation.  On POD#1,  pt was out of bed to chair.  Pt's pain was controlled on PCA, which was discontinued by end of POD#1 and pt was placed on PO meds.  Pt was transitioned from Heparin to Lovenox for continued DVT prophylaxis. Her Gray catheter was discontinued and she was able to void spontaneously.  Pt vital signs remained stable throughout post-operative course.  Pt tolerated reg diet.  On POD#2, pt was ambulating at will. She was tolerating PO Meds and tolerating reg diet.  Patient was discharged to home in clinically stable condition. Vital signs were stable as well and lab values are stated below.  Pt has had an uneventful postoperative course.   Patient to have close follow up with Dr. Rodriguez.     Upon discharge 1/9/2022 on POD# 2, case was discussed with Dr. Reese that the patient is medically cleared and optimized for discharge today. The patient is ambulating and voiding spontaneously, tolerating oral intake, pain was well controlled with oral medication, and vital signs were stable. All home care has been explained to pt and family.  Pt understands home instructions and all questions have been answered regarding home care and discharge.  Medications sent to pharmacy of pt choice.    LABS:             12.2   6.22  )-----------( 157      ( 01-09 @ 06:46 )             36.4                11.3   9.24  )-----------( 164      ( 01-08 @ 09:30 )             34.3     Vital Signs Last 24 Hrs  T(C): 37 (09 Jan 2022 10:15), Max: 37 (09 Jan 2022 10:15)  T(F): 98.6 (09 Jan 2022 10:15), Max: 98.6 (09 Jan 2022 10:15)  HR: 63 (09 Jan 2022 10:15) (63 - 76)  BP: 119/64 (09 Jan 2022 10:15) (104/58 - 119/64)  RR: 18 (09 Jan 2022 10:15) (18 - 18)  SpO2: 99% (09 Jan 2022 10:15) (97% - 100%)

## 2022-01-07 NOTE — DISCHARGE NOTE PROVIDER - NSDCCPTREATMENT_GEN_ALL_CORE_FT
PRINCIPAL PROCEDURE  Procedure: Total abdominal hysterectomy  Findings and Treatment:       SECONDARY PROCEDURE  Procedure: Left oophorectomy  Findings and Treatment:     Procedure: Total abdominal hysterectomy  Findings and Treatment:

## 2022-01-07 NOTE — DISCHARGE NOTE PROVIDER - CARE PROVIDER_API CALL
Annelise Rodriguez  GYNECOLOGIC ONCOLOGY  [address] [address] [place] [address]  Phone: [phone]  Fax: [phone]  Established Patient  Follow Up Time: 2 weeks

## 2022-01-07 NOTE — BRIEF OPERATIVE NOTE - NSICDXBRIEFPOSTOP_GEN_ALL_CORE_FT
POST-OP DIAGNOSIS:  Complex atypical endometrial hyperplasia 07-Jan-2022 12:51:12  Keli Kent   Edema, unspecified type

## 2022-01-07 NOTE — DISCHARGE NOTE PROVIDER - WILL THE PATIENT ACCEPT THE PFIZER COVID-19 VACCINE IF ELIGIBLE AND IT IS AVAILABLE?
Patient's blood pressure elevated today in office (systolic 586)  She does not check her blood pressure at home regularly  She notes that she can feel when her blood pressure is elevated and that this happens quite frequently  --- For this reason I have increased her Diovan-HCT  from 160-25  to 320-25     ---Patient to return in 1 month for  blood pressure recheck  Not applicable

## 2022-01-07 NOTE — DISCHARGE NOTE PROVIDER - NSDCMRMEDTOKEN_GEN_ALL_CORE_FT
losartan-hydrochlorothiazide 100 mg-25 mg oral tablet: 1 tab(s) orally once a day  potassium chloride 20 mEq oral tablet, extended release: 1 tab(s) orally once a day  Toprol-XL 50 mg oral tablet, extended release: 1 tab(s) orally once a day  Vitamin C: 1 tab(s) orally once a day  Vitamin D3: 1 tab(s) orally once a day   losartan-hydrochlorothiazide 100 mg-25 mg oral tablet: 1 tab(s) orally once a day  oxyCODONE 5 mg oral capsule: 1 cap(s) orally every 4 hours MDD:6  potassium chloride 20 mEq oral tablet, extended release: 1 tab(s) orally once a day  Toprol-XL 50 mg oral tablet, extended release: 1 tab(s) orally once a day  Vitamin C: 1 tab(s) orally once a day  Vitamin D3: 1 tab(s) orally once a day   acetaminophen 325 mg oral tablet: 3 tab(s) orally every 6 hours  ibuprofen 600 mg oral tablet: 1 tab(s) orally every 6 hours MDD:4  losartan-hydrochlorothiazide 100 mg-25 mg oral tablet: 1 tab(s) orally once a day  oxyCODONE 5 mg oral tablet: 1 tab(s) orally every 4 to 6 hours, As Needed - 6) MDD:4  potassium chloride 20 mEq oral tablet, extended release: 1 tab(s) orally once a day  simethicone 80 mg oral tablet, chewable: 1 tab(s) orally every 6 hours, As needed, Gas  Toprol-XL 50 mg oral tablet, extended release: 1 tab(s) orally once a day  Vitamin C: 1 tab(s) orally once a day  Vitamin D3: 1 tab(s) orally once a day

## 2022-01-07 NOTE — DISCHARGE NOTE PROVIDER - NSRESEARCHGRANT_OVERRIDEREC_GEN_A_CORE
Brief Operative Note    Patient: Cecil Younger 78 year old male    MRN: [id number]    Surgeon(s): Misael Oleary MD  Phone Number: [phone]                       Surgeon(s) and Role:     * Misael Oleary MD - Primary    Assistant(s): none    Pre-Op Diagnosis: LEFT LOWER LID ECTROPION     Post-Op Diagnosis: same     Procedure: Procedure(s):  LEFT LOWER LID ECTROPION REPAIR    Anesthesia Type: MAC                                   Complications: None    Description: none    Findings: none    Specimens Removed: No specimens collected     Estimated Blood Loss: No Value trace    Assistant Tasks: None     Implants: * No implants in log *      I was present for the key portions of the procedure and was immediately available for the non-key portions         Planned surgery/procedure

## 2022-01-07 NOTE — DISCHARGE NOTE PROVIDER - NSDCFUADDAPPT_GEN_ALL_CORE_FT
Please call Dr. Rodriguez's office at [phone] to schedule a post-op appointment Please call Dr. Rodriguez's office at [phone] to schedule a post-op appointment

## 2022-01-07 NOTE — DISCHARGE NOTE PROVIDER - NSDCFUADDINST_GEN_ALL_CORE_FT
Return to your regular way of eating.  Resume normal activity as tolerated, but no heavy lifting or strenuous activity for 6 weeks.  No driving for next 2 weeks and/or while on narcotic pain medication.  Complete vaginal rest, no tampons, no douching, no tub bathing, no sexual activities for 6 weeks unless otherwise instructed by your doctor.  Call your doctor with any signs and symptoms of infection such as fever, chills, nausea or vomiting.  Call your doctor with redness or swelling at the incision site, fluid leakage or wound separation.  Call your doctor if you're unable to tolerate food or have difficulty urinating.  Call your doctor if you have pain that is not relieved by your prescribed medications.  Notify your doctor with any other concerns.  Follow up with Dr. Rodriguez in x2 weeks .

## 2022-01-07 NOTE — CHART NOTE - NSCHARTNOTEFT_GEN_A_CORE
R1 GYN POST-OP CHECK NOTE    SUBJECTIVE:    45yo F now POD0 s/p MUSA, left oophorectomy, and bilateral salpingectomy. Pain controlled by pain meds. Not yet OOB. Martinez cath in place. Tolerated PO w/o n/v. Denies fevers, chills, n/v, chest pain, or shortness of breath.     acetaminophen   IVPB .. 1000 milliGRAM(s) IV Intermittent once  acetaminophen   IVPB .. 1000 milliGRAM(s) IV Intermittent once  acetaminophen   IVPB .. 1000 milliGRAM(s) IV Intermittent once  heparin   Injectable 5000 Unit(s) SubCutaneous every 8 hours  HYDROmorphone  Injectable 0.5 milliGRAM(s) IV Push every 15 minutes PRN  HYDROmorphone PCA (1 mG/mL) 30 milliLiter(s) PCA Continuous PCA Continuous  HYDROmorphone PCA (1 mG/mL) Rescue Clinician Bolus 0.5 milliGRAM(s) IV Push every 15 minutes PRN  ketorolac   Injectable 30 milliGRAM(s) IV Push every 8 hours  lactated ringers. 1000 milliLiter(s) IV Continuous <Continuous>  metoprolol tartrate Injectable 5 milliGRAM(s) IV Push every 6 hours PRN  naloxone Injectable 0.1 milliGRAM(s) IV Push every 3 minutes PRN  ondansetron Injectable 4 milliGRAM(s) IV Push every 6 hours PRN  ondansetron Injectable 4 milliGRAM(s) IV Push every 4 hours PRN  simethicone 80 milliGRAM(s) Chew every 6 hours PRN      OBJECTIVE:    VITAL SIGNS:  Vital Signs Last 24 Hrs  T(C): 36.7 (07 Jan 2022 13:00), Max: 36.9 (07 Jan 2022 06:14)  T(F): 98.1 (07 Jan 2022 13:00), Max: 98.5 (07 Jan 2022 06:14)  HR: 72 (07 Jan 2022 13:15) (55 - 77)  BP: 106/50 (07 Jan 2022 13:15) (106/50 - 125/69)  BP(mean): 61 (07 Jan 2022 13:15) (61 - 103)  RR: 19 (07 Jan 2022 13:15) (13 - 22)  SpO2: 96% (07 Jan 2022 13:15) (88% - 97%)  CAPILLARY BLOOD GLUCOSE      POCT Blood Glucose.: 128 mg/dL (07 Jan 2022 13:00)  POCT Blood Glucose.: 126 mg/dL (07 Jan 2022 06:42)      01-06-22 @ 07:01  -  01-07-22 @ 07:00  --------------------------------------------------------  IN: 30 mL / OUT: 0 mL / NET: 30 mL    01-07-22 @ 07:01  -  01-07-22 @ 13:31  --------------------------------------------------------  IN: 570 mL / OUT: 0 mL / NET: 570 mL        PHYSICAL EXAM:  GEN: No acute distress  CV: Irregular rhythm, bigeminy on monitor. Regular rate. No murmurs appreciated   LUNGS: Clear to auscultation anteriorly bilaterally with distant breath sounds. On 1L NC saturating 97%  ABD: Soft. Non-tender. Midline vertical incision c/d/i w/ overlying dermabond prineo   EXT: No calf tenderness. SCDs in place bilaterally     LABS:          ASSESSMENT:  45yo F now POD0 s/p MUSA, left oophorectomy, and bilateral salpingectomy. Frozen was benign. Patient is stable and progressing appropriately post-op.    NEURO: Will continue with IV Acetaminophen, IV Toradol, and IV Dilaudid PRN. Will c/w Dilaudid PRN given unavailability of PCA pumps  CV: Hemodyamically stable  PULM: Saturating well on 1L NC. Will wean as tolerated   GI: Advance diet as tolerated; Colace/Mylicon prn  : Continue martinez   HEME: SCDs. HSQ. Will have OOB in the PM  ID: Afebriaixa Bowman, PGY-1  Obstetrics and Gynecology

## 2022-01-07 NOTE — BRIEF OPERATIVE NOTE - OPERATION/FINDINGS
Exam under anesthesia revealed 16w sized bulky uterus, mobile. RV septum smooth.  Abdominal survey revealed no gross intraabdominal disease.   Pelvic survey--bulky fibroid uterus and grossly normal bilateral tubes and ovaries.  L adnexae with adhesions to posterior uterus    Frozen=benign

## 2022-01-07 NOTE — BRIEF OPERATIVE NOTE - NSICDXBRIEFPROCEDURE_GEN_ALL_CORE_FT
PROCEDURES:  Total abdominal hysterectomy 07-Jan-2022 12:50:26  Keli Kent  Bilateral salpingectomy 07-Jan-2022 12:50:37  Keli Kent  Left oophorectomy 07-Jan-2022 12:50:49  Keli Kent

## 2022-01-08 LAB
ANION GAP SERPL CALC-SCNC: 11 MMOL/L — SIGNIFICANT CHANGE UP (ref 7–14)
BASOPHILS # BLD AUTO: 0.03 K/UL — SIGNIFICANT CHANGE UP (ref 0–0.2)
BASOPHILS NFR BLD AUTO: 0.3 % — SIGNIFICANT CHANGE UP (ref 0–2)
BUN SERPL-MCNC: 12 MG/DL — SIGNIFICANT CHANGE UP (ref 7–23)
CALCIUM SERPL-MCNC: 8.1 MG/DL — LOW (ref 8.4–10.5)
CHLORIDE SERPL-SCNC: 104 MMOL/L — SIGNIFICANT CHANGE UP (ref 98–107)
CO2 SERPL-SCNC: 21 MMOL/L — LOW (ref 22–31)
CREAT SERPL-MCNC: 0.58 MG/DL — SIGNIFICANT CHANGE UP (ref 0.5–1.3)
EOSINOPHIL # BLD AUTO: 0.02 K/UL — SIGNIFICANT CHANGE UP (ref 0–0.5)
EOSINOPHIL NFR BLD AUTO: 0.2 % — SIGNIFICANT CHANGE UP (ref 0–6)
GLUCOSE SERPL-MCNC: 117 MG/DL — HIGH (ref 70–99)
HCT VFR BLD CALC: 34.3 % — LOW (ref 34.5–45)
HGB BLD-MCNC: 11.3 G/DL — LOW (ref 11.5–15.5)
IANC: 6.73 K/UL — SIGNIFICANT CHANGE UP (ref 1.5–8.5)
IMM GRANULOCYTES NFR BLD AUTO: 0.2 % — SIGNIFICANT CHANGE UP (ref 0–1.5)
LYMPHOCYTES # BLD AUTO: 1.74 K/UL — SIGNIFICANT CHANGE UP (ref 1–3.3)
LYMPHOCYTES # BLD AUTO: 18.8 % — SIGNIFICANT CHANGE UP (ref 13–44)
MAGNESIUM SERPL-MCNC: 1.5 MG/DL — LOW (ref 1.6–2.6)
MCHC RBC-ENTMCNC: 31.3 PG — SIGNIFICANT CHANGE UP (ref 27–34)
MCHC RBC-ENTMCNC: 32.9 GM/DL — SIGNIFICANT CHANGE UP (ref 32–36)
MCV RBC AUTO: 95 FL — SIGNIFICANT CHANGE UP (ref 80–100)
MONOCYTES # BLD AUTO: 0.7 K/UL — SIGNIFICANT CHANGE UP (ref 0–0.9)
MONOCYTES NFR BLD AUTO: 7.6 % — SIGNIFICANT CHANGE UP (ref 2–14)
NEUTROPHILS # BLD AUTO: 6.73 K/UL — SIGNIFICANT CHANGE UP (ref 1.8–7.4)
NEUTROPHILS NFR BLD AUTO: 72.9 % — SIGNIFICANT CHANGE UP (ref 43–77)
NRBC # BLD: 0 /100 WBCS — SIGNIFICANT CHANGE UP
NRBC # FLD: 0 K/UL — SIGNIFICANT CHANGE UP
PHOSPHATE SERPL-MCNC: 2.5 MG/DL — SIGNIFICANT CHANGE UP (ref 2.5–4.5)
PLATELET # BLD AUTO: 164 K/UL — SIGNIFICANT CHANGE UP (ref 150–400)
POTASSIUM SERPL-MCNC: 3.6 MMOL/L — SIGNIFICANT CHANGE UP (ref 3.5–5.3)
POTASSIUM SERPL-SCNC: 3.6 MMOL/L — SIGNIFICANT CHANGE UP (ref 3.5–5.3)
RBC # BLD: 3.61 M/UL — LOW (ref 3.8–5.2)
RBC # FLD: 12.8 % — SIGNIFICANT CHANGE UP (ref 10.3–14.5)
SODIUM SERPL-SCNC: 136 MMOL/L — SIGNIFICANT CHANGE UP (ref 135–145)
WBC # BLD: 9.24 K/UL — SIGNIFICANT CHANGE UP (ref 3.8–10.5)
WBC # FLD AUTO: 9.24 K/UL — SIGNIFICANT CHANGE UP (ref 3.8–10.5)

## 2022-01-08 RX ORDER — SODIUM CHLORIDE 9 MG/ML
500 INJECTION, SOLUTION INTRAVENOUS ONCE
Refills: 0 | Status: COMPLETED | OUTPATIENT
Start: 2022-01-08 | End: 2022-01-08

## 2022-01-08 RX ORDER — SODIUM CHLORIDE 9 MG/ML
3 INJECTION INTRAMUSCULAR; INTRAVENOUS; SUBCUTANEOUS EVERY 8 HOURS
Refills: 0 | Status: DISCONTINUED | OUTPATIENT
Start: 2022-01-08 | End: 2022-01-09

## 2022-01-08 RX ORDER — MAGNESIUM OXIDE 400 MG ORAL TABLET 241.3 MG
400 TABLET ORAL ONCE
Refills: 0 | Status: COMPLETED | OUTPATIENT
Start: 2022-01-08 | End: 2022-01-08

## 2022-01-08 RX ORDER — METOPROLOL TARTRATE 50 MG
50 TABLET ORAL DAILY
Refills: 0 | Status: DISCONTINUED | OUTPATIENT
Start: 2022-01-08 | End: 2022-01-09

## 2022-01-08 RX ORDER — OXYCODONE HYDROCHLORIDE 5 MG/1
10 TABLET ORAL
Refills: 0 | Status: DISCONTINUED | OUTPATIENT
Start: 2022-01-08 | End: 2022-01-09

## 2022-01-08 RX ORDER — ACETAMINOPHEN 500 MG
650 TABLET ORAL EVERY 6 HOURS
Refills: 0 | Status: DISCONTINUED | OUTPATIENT
Start: 2022-01-08 | End: 2022-01-08

## 2022-01-08 RX ORDER — ENOXAPARIN SODIUM 100 MG/ML
40 INJECTION SUBCUTANEOUS EVERY 12 HOURS
Refills: 0 | Status: DISCONTINUED | OUTPATIENT
Start: 2022-01-08 | End: 2022-01-09

## 2022-01-08 RX ORDER — ACETAMINOPHEN 500 MG
975 TABLET ORAL EVERY 6 HOURS
Refills: 0 | Status: DISCONTINUED | OUTPATIENT
Start: 2022-01-08 | End: 2022-01-09

## 2022-01-08 RX ORDER — POTASSIUM CHLORIDE 20 MEQ
40 PACKET (EA) ORAL EVERY 4 HOURS
Refills: 0 | Status: COMPLETED | OUTPATIENT
Start: 2022-01-08 | End: 2022-01-08

## 2022-01-08 RX ORDER — LOSARTAN POTASSIUM 100 MG/1
100 TABLET, FILM COATED ORAL DAILY
Refills: 0 | Status: DISCONTINUED | OUTPATIENT
Start: 2022-01-08 | End: 2022-01-09

## 2022-01-08 RX ORDER — OXYCODONE HYDROCHLORIDE 5 MG/1
5 TABLET ORAL
Refills: 0 | Status: DISCONTINUED | OUTPATIENT
Start: 2022-01-08 | End: 2022-01-09

## 2022-01-08 RX ORDER — HYDROMORPHONE HYDROCHLORIDE 2 MG/ML
0.5 INJECTION INTRAMUSCULAR; INTRAVENOUS; SUBCUTANEOUS
Refills: 0 | Status: DISCONTINUED | OUTPATIENT
Start: 2022-01-08 | End: 2022-01-08

## 2022-01-08 RX ADMIN — Medication 975 MILLIGRAM(S): at 11:53

## 2022-01-08 RX ADMIN — Medication 30 MILLIGRAM(S): at 14:36

## 2022-01-08 RX ADMIN — SIMETHICONE 80 MILLIGRAM(S): 80 TABLET, CHEWABLE ORAL at 01:55

## 2022-01-08 RX ADMIN — OXYCODONE HYDROCHLORIDE 5 MILLIGRAM(S): 5 TABLET ORAL at 17:39

## 2022-01-08 RX ADMIN — Medication 40 MILLIEQUIVALENT(S): at 14:21

## 2022-01-08 RX ADMIN — HEPARIN SODIUM 5000 UNIT(S): 5000 INJECTION INTRAVENOUS; SUBCUTANEOUS at 11:02

## 2022-01-08 RX ADMIN — SODIUM CHLORIDE 150 MILLILITER(S): 9 INJECTION, SOLUTION INTRAVENOUS at 11:06

## 2022-01-08 RX ADMIN — Medication 30 MILLIGRAM(S): at 03:37

## 2022-01-08 RX ADMIN — Medication 975 MILLIGRAM(S): at 17:37

## 2022-01-08 RX ADMIN — Medication 975 MILLIGRAM(S): at 10:53

## 2022-01-08 RX ADMIN — Medication 50 MILLIGRAM(S): at 11:03

## 2022-01-08 RX ADMIN — SODIUM CHLORIDE 3 MILLILITER(S): 9 INJECTION INTRAMUSCULAR; INTRAVENOUS; SUBCUTANEOUS at 22:02

## 2022-01-08 RX ADMIN — SODIUM CHLORIDE 1000 MILLILITER(S): 9 INJECTION, SOLUTION INTRAVENOUS at 01:54

## 2022-01-08 RX ADMIN — Medication 40 MILLIEQUIVALENT(S): at 17:43

## 2022-01-08 RX ADMIN — MAGNESIUM OXIDE 400 MG ORAL TABLET 400 MILLIGRAM(S): 241.3 TABLET ORAL at 21:51

## 2022-01-08 RX ADMIN — Medication 30 MILLIGRAM(S): at 10:53

## 2022-01-08 RX ADMIN — SODIUM CHLORIDE 3 MILLILITER(S): 9 INJECTION INTRAMUSCULAR; INTRAVENOUS; SUBCUTANEOUS at 17:29

## 2022-01-08 RX ADMIN — HEPARIN SODIUM 5000 UNIT(S): 5000 INJECTION INTRAVENOUS; SUBCUTANEOUS at 04:00

## 2022-01-08 RX ADMIN — Medication 400 MILLIGRAM(S): at 03:38

## 2022-01-08 RX ADMIN — ENOXAPARIN SODIUM 40 MILLIGRAM(S): 100 INJECTION SUBCUTANEOUS at 19:32

## 2022-01-08 RX ADMIN — Medication 30 MILLIGRAM(S): at 21:51

## 2022-01-08 RX ADMIN — Medication 30 MILLIGRAM(S): at 10:08

## 2022-01-08 RX ADMIN — LOSARTAN POTASSIUM 100 MILLIGRAM(S): 100 TABLET, FILM COATED ORAL at 11:03

## 2022-01-08 RX ADMIN — OXYCODONE HYDROCHLORIDE 5 MILLIGRAM(S): 5 TABLET ORAL at 22:51

## 2022-01-08 RX ADMIN — Medication 30 MILLIGRAM(S): at 14:21

## 2022-01-08 RX ADMIN — OXYCODONE HYDROCHLORIDE 5 MILLIGRAM(S): 5 TABLET ORAL at 21:51

## 2022-01-08 NOTE — PROGRESS NOTE ADULT - SUBJECTIVE AND OBJECTIVE BOX
Anesthesia Pain Management Service    SUBJECTIVE: Patient is doing well with IV PCA and no significant problems reported.     Pain Scale Score	At rest: __6_ 	With Activity: ___ 	[X ] Refer to charted pain scores    THERAPY:    [ ] IV PCA Morphine		[ ] 5 mg/mL	[ ] 1 mg/mL  [X ] IV PCA Hydromorphone	[ ] 5 mg/mL	[X ] 1 mg/mL  [ ] IV PCA Fentanyl		[ ] 50 micrograms/mL      Demand dose __0.2_ lockout __6_ (minutes) Continuous Rate _0__ Total: _5.7__   mg used (in past 24 hrs)        MEDICATIONS  (STANDING):  acetaminophen     Tablet .. 975 milliGRAM(s) Oral every 6 hours  heparin   Injectable 5000 Unit(s) SubCutaneous every 8 hours  ketorolac   Injectable 30 milliGRAM(s) IV Push every 8 hours  lactated ringers. 1000 milliLiter(s) (150 mL/Hr) IV Continuous <Continuous>  losartan 100 milliGRAM(s) Oral daily  metoprolol succinate ER 50 milliGRAM(s) Oral daily    MEDICATIONS  (PRN):  naloxone Injectable 0.1 milliGRAM(s) IV Push every 3 minutes PRN For ANY of the following changes in patient status:  A. RR LESS THAN 10 breaths per minute, B. Oxygen saturation LESS THAN 90%, C. Sedation score of 6  ondansetron Injectable 4 milliGRAM(s) IV Push every 4 hours PRN Nausea and/or Vomiting  oxyCODONE    IR 5 milliGRAM(s) Oral every 3 hours PRN Moderate Pain (4 - 6)  oxyCODONE    IR 10 milliGRAM(s) Oral every 3 hours PRN Severe Pain (7 - 10)  simethicone 80 milliGRAM(s) Chew every 6 hours PRN Gas      OBJECTIVE:    Sedation Score:	[ X] Alert	[ ] Drowsy 	[ ] Arousable	[ ] Asleep	[ ] Unresponsive    Side Effects:	[X ] None	[ ] Nausea	[ ] Vomiting	[ ] Pruritus  		[ ] Other:    Vital Signs Last 24 Hrs  T(C): 36.7 (08 Jan 2022 10:00), Max: 36.8 (07 Jan 2022 14:19)  T(F): 98 (08 Jan 2022 10:00), Max: 98.3 (07 Jan 2022 14:19)  HR: 57 (08 Jan 2022 10:00) (52 - 77)  BP: 126/67 (08 Jan 2022 10:00) (101/45 - 128/55)  BP(mean): 70 (07 Jan 2022 14:00) (61 - 103)  RR: 17 (08 Jan 2022 10:00) (13 - 22)  SpO2: 100% (08 Jan 2022 10:00) (88% - 100%)    Appears well. Sitting up in bed, smiling.    ASSESSMENT/ PLAN    Therapy to  be:	[ ] Continue   [ X] Discontinued   [X ] Change to prn Analgesics    Documentation and Verification of current medications:   [X] Done	[ ] Not done, not eligible    Comments: PRN Oral/IV opioids and/or Adjuvant non-opioid medication to be ordered at this point.    Progress Note written now but Patient was seen earlier.

## 2022-01-08 NOTE — PROGRESS NOTE ADULT - ASSESSMENT
A/P: 46y POD#1 Ex-lap, MUSA, BS, and LO. (Frozen = benign) .  Patient is stable and doing well.      Neuro: pain well controlled on IV pain medication. Transition to PO pain medication today.   CV: Hemodynamically stable. f/u H/H stable on AM labs.   Pulm: Saturating well on room air, encourage oob/amb  GI: Continue regular diet  : UOP adequate, d/c martinez  this morning  Heme: HSQ and SCDs for DVT ppx. Transition to Lovenox today.   FEN: LR@125.  replete electrolytes prn   ID: Afebrile  Endo: No active issues   Dispo: Continue routine post-op care    Juan Ramon PGY2 A/P: 46y POD#1 Ex-lap, MUSA, BS, and LO. (Frozen = benign) .  Patient is stable and doing well.      Neuro: pain well controlled on IV pain medication. Transition to PO pain medication today.   CV: Hemodynamically stable. f/u H/H stable on AM labs.   -Hx of HTN, Lopressor PRN, will restart home BP meds.   Pulm: Saturating well on room air, encourage oob/amb  GI: Continue regular diet  : Martinez replaced O/N 2/2 to low UOP. UOP now adequate, d/c martinez  this morning  Heme: HSQ and SCDs for DVT ppx. Transition to Lovenox today.   FEN: LR@125.  replete electrolytes prn   ID: Afebrile  Endo: No active issues   Dispo: Continue routine post-op care    Juan Ramon PGY2 A/P: 46y POD#1 Ex-lap, MUSA, BS, and LO. (Frozen = benign) .  Patient is stable and doing well.      Neuro: pain well controlled on IV pain medication. Transition to PO pain medication today.   CV: Hemodynamically stable. f/u H/H stable on AM labs.   -Hx of HTN, Lopressor PRN, will restart home BP meds.   Pulm: Saturating well on room air, encourage oob/amb  GI: Continue regular diet. c/w bowel regimen.   : Martinez replaced O/N 2/2 to low UOP. UOP now adequate, d/c martinez  this morning  Heme: HSQ and SCDs for DVT ppx. Transition to Lovenox today.   FEN: LR@125.  replete electrolytes prn   ID: Afebrile  Endo: No active issues   Dispo: Continue routine post-op care    Juan Ramon PGY2 A/P: 46y POD#1 Ex-lap, MUSA, BS, and LO. (Frozen = benign) .  Patient is stable and doing well.      Neuro: pain well controlled on IV pain medication. Transition to PO pain medication today.   CV: Hemodynamically stable. f/u H/H stable on AM labs.   -Hx of HTN, Lopressor PRN, will restart home BP meds.   Pulm: Placed on 2L O/N after desaturating to 89%. Saturating well on room air this morning., encourage oob/amb  GI: ADAT. c/w bowel regimen.   : Martinez replaced O/N 2/2 to low UOP. UOP now adequate, d/c martinez  this morning pending AM labs.   Heme: HSQ and SCDs for DVT ppx. Transition to Lovenox today.   FEN: LR@125.  replete electrolytes prn   ID: Afebrile  Endo: No active issues   Dispo: Continue routine post-op care    Juan Ramon PGY2 A/P: 46y POD#1 Ex-lap, Total abdominal hysterectomy, Bilateral salpingectomy, Left oophorectomy  (Frozen = benign) .  Patient is stable and doing well.       Neuro: pain well controlled on PCa pump, IV Tylenol, Toradol. Transition to PO pain medication today.   CV: Hemodynamically stable, h/o HTN, Lopressor prn, will restart home BP meds.   Pulm: Placed on 2L O/N after desaturating to 89%. Saturating well on room air this morning,  encourage ambulation  ID: afebrile, f/u am CBC  GI: advance to regular diet , c/w bowel regimen.   : Martinez replaced O/N 2/2 to low UOP. UOP now adequate, d/c martinez  this morning pending AM labs.   Heme: HSQ and SCDs for DVT ppx. Transition to Lovenox today.   FEN: LR@150. F/U BMP-replete electrolytes prn   Dispo: Continue routine post-op care    Johnathan Grey PA-C  #[id number]

## 2022-01-08 NOTE — PROGRESS NOTE ADULT - ATTENDING COMMENTS
Patient seen and examined.   Doing well after surgery.   Agree with assessment and plan as written by fellow and housestaff.   DC martinez catheter for TOV.

## 2022-01-08 NOTE — CHART NOTE - NSCHARTNOTEFT_GEN_A_CORE
Pt seen and examined at bedside. Since her surgery yesterday morning, she has had borderline urine output. ~50cc/hour, adequate UOP would be 65cc/hour. She also reports bladder pressure and a sensation of a full bladder.  Gray flushed at bedside, with little to no output. Gray replaced sterilely at bedside with ~300cc output.   - 500cc bolus  - strict Is/Os  - monitor VS    D/W Dr. Narciso Chavira, PGY-2 Pt seen and examined at bedside. Since her surgery yesterday morning, she has had borderline urine output. ~50cc/hour, adequate UOP would be 65cc/hour. She also reports bladder pressure and a sensation of a full bladder.  Gray flushed at bedside, with little to no output. Gray replaced sterilely at bedside with ~300cc output.   - 500cc bolus  - strict Is/Os  - monitor VS    D/W Dr. Narciso Chavira, PGY-2    ----------------------------ADDENDUM----------------------------  - Pt reports feeling of pressure relieved  - s/p 500cc bolus  - UOP 375cc for the past 2 hours    mary kate pgy2

## 2022-01-08 NOTE — PROVIDER CONTACT NOTE (OTHER) - ASSESSMENT
Pt is restless in bed with complaints of pelvic pain and pressure. Pt states, "Octavia been feeling pressure in my bladder for hours now."
Pt is sleeping in bed, PCA pump in place.

## 2022-01-08 NOTE — PROGRESS NOTE ADULT - SUBJECTIVE AND OBJECTIVE BOX
Anesthesia Pain Management Service    SUBJECTIVE: Patient is doing well with IV PCA and no significant problems reported.    Pain Scale Score	At rest: _6-7/10_ 	With Activity: ___ 	[X ] Refer to charted pain scores    THERAPY:    [ ] IV PCA Morphine		[ ] 5 mg/mL	[ ] 1 mg/mL  [X ] IV PCA Hydromorphone	[ ] 5 mg/mL	[X ] 1 mg/mL  [ ] IV PCA Fentanyl		[ ] 50 micrograms/mL    Demand dose __0.2_ lockout __6_ (minutes) Continuous Rate _0__ Total: _5.7__   mg used (in past 24 hrs)      MEDICATIONS  (STANDING):  acetaminophen     Tablet .. 650 milliGRAM(s) Oral every 6 hours  heparin   Injectable 5000 Unit(s) SubCutaneous every 8 hours  ketorolac   Injectable 30 milliGRAM(s) IV Push every 8 hours  lactated ringers. 1000 milliLiter(s) (150 mL/Hr) IV Continuous <Continuous>    MEDICATIONS  (PRN):  HYDROmorphone  Injectable 0.5 milliGRAM(s) IV Push every 3 hours PRN Severe breakthrough  Pain (7 - 10)  metoprolol tartrate Injectable 5 milliGRAM(s) IV Push every 6 hours PRN SBP>160, DBP>110  naloxone Injectable 0.1 milliGRAM(s) IV Push every 3 minutes PRN For ANY of the following changes in patient status:  A. RR LESS THAN 10 breaths per minute, B. Oxygen saturation LESS THAN 90%, C. Sedation score of 6  ondansetron Injectable 4 milliGRAM(s) IV Push every 4 hours PRN Nausea and/or Vomiting  oxyCODONE    IR 5 milliGRAM(s) Oral every 3 hours PRN Moderate Pain (4 - 6)  oxyCODONE    IR 10 milliGRAM(s) Oral every 3 hours PRN Severe Pain (7 - 10)  simethicone 80 milliGRAM(s) Chew every 6 hours PRN Gas      OBJECTIVE:  Patient is sitting up in bed eating breakfast.     Sedation Score:	[ X] Alert	[ ] Drowsy 	[ ] Arousable	[ ] Asleep	[ ] Unresponsive    Side Effects:	[X ] None	[ ] Nausea	[ ] Vomiting	[ ] Pruritus  		[ ] Other:    Vital Signs Last 24 Hrs  T(C): 36.6 (08 Jan 2022 05:19), Max: 36.8 (07 Jan 2022 14:19)  T(F): 97.9 (08 Jan 2022 05:19), Max: 98.3 (07 Jan 2022 14:19)  HR: 57 (08 Jan 2022 05:19) (52 - 77)  BP: 107/56 (08 Jan 2022 05:19) (101/45 - 128/55)  BP(mean): 70 (07 Jan 2022 14:00) (61 - 103)  RR: 17 (08 Jan 2022 05:19) (13 - 22)  SpO2: 98% (08 Jan 2022 05:19) (88% - 98%)    ASSESSMENT/ PLAN    Therapy to  be:	[ ] Continue   [ X] Discontinued   [X ] Change to prn Analgesics    Documentation and Verification of current medications:   [X] Done	[ ] Not done, not elligible    Comments: Discussed patient with team.  IV Dilaudid PCA discontinued. PRN Oral/IV opioids and/or Adjuvant non-opioid medication to be ordered at this point.    Progress Note written now but Patient was seen earlier.

## 2022-01-08 NOTE — PROGRESS NOTE ADULT - SUBJECTIVE AND OBJECTIVE BOX
R2 GYN ONC Progress Note     Patient seen and examined at bedside.  No acute events overnight. No acute complaints.  Pain well controlled.  Patient is ambulating and tolerating.....   Has not yet passed flatus vs. Patient is passing flatus.    Gray is still in place.   Denies CP, SOB, N/V, fevers, and chills.    Vital Signs Last 24 Hours  T(C): 36.7 (01-08-22 @ 01:35), Max: 36.9 (01-07-22 @ 06:14)  HR: 52 (01-08-22 @ 01:35) (52 - 77)  BP: 101/45 (01-08-22 @ 01:35) (101/45 - 128/55)  RR: 17 (01-08-22 @ 01:35) (13 - 22)  SpO2: 98% (01-08-22 @ 01:35) (88% - 98%)    I&O's Summary    06 Jan 2022 07:01  -  07 Jan 2022 07:00  --------------------------------------------------------  IN: 30 mL / OUT: 0 mL / NET: 30 mL    07 Jan 2022 07:01  -  08 Jan 2022 05:06  --------------------------------------------------------  IN: 810 mL / OUT: 600 mL / NET: 210 mL        Physical Exam:  General: NAD  CV: RRR  Lungs: CTA b/l, good air flow b/l   Abdomen: Soft, mildly-tender to palpation diffusely, softly distended, normoactive bowel sounds  Incision:  Midline vertical incision C/D/I, dressing in place  Ext: No pain or swelling     Labs:      MEDICATIONS  (STANDING):  acetaminophen   IVPB .. 1000 milliGRAM(s) IV Intermittent once  heparin   Injectable 5000 Unit(s) SubCutaneous every 8 hours  HYDROmorphone PCA (1 mG/mL) 30 milliLiter(s) PCA Continuous PCA Continuous  ketorolac   Injectable 30 milliGRAM(s) IV Push every 8 hours  lactated ringers. 1000 milliLiter(s) (150 mL/Hr) IV Continuous <Continuous>    MEDICATIONS  (PRN):  HYDROmorphone  Injectable 0.5 milliGRAM(s) IV Push every 15 minutes PRN Severe Pain (7 - 10)  HYDROmorphone PCA (1 mG/mL) Rescue Clinician Bolus 0.5 milliGRAM(s) IV Push every 15 minutes PRN for Pain Scale GREATER THAN 6  metoprolol tartrate Injectable 5 milliGRAM(s) IV Push every 6 hours PRN SBP>160, DBP>110  naloxone Injectable 0.1 milliGRAM(s) IV Push every 3 minutes PRN For ANY of the following changes in patient status:  A. RR LESS THAN 10 breaths per minute, B. Oxygen saturation LESS THAN 90%, C. Sedation score of 6  ondansetron Injectable 4 milliGRAM(s) IV Push every 6 hours PRN Nausea  ondansetron Injectable 4 milliGRAM(s) IV Push every 4 hours PRN Nausea and/or Vomiting  simethicone 80 milliGRAM(s) Chew every 6 hours PRN Gas       R2 GYN ONC Progress Note     Patient seen and examined at bedside.  No acute events overnight. No acute complaints.  Pain well controlled.  Patient is ambulating and tolerating regular diet.   Has not yet passed flatus vs. Patient is passing flatus.    Gray is still in place.   Denies CP, SOB, N/V, fevers, and chills.    Vital Signs Last 24 Hours  T(C): 36.7 (01-08-22 @ 01:35), Max: 36.9 (01-07-22 @ 06:14)  HR: 52 (01-08-22 @ 01:35) (52 - 77)  BP: 101/45 (01-08-22 @ 01:35) (101/45 - 128/55)  RR: 17 (01-08-22 @ 01:35) (13 - 22)  SpO2: 98% (01-08-22 @ 01:35) (88% - 98%)    I&O's Summary    06 Jan 2022 07:01  -  07 Jan 2022 07:00  --------------------------------------------------------  IN: 30 mL / OUT: 0 mL / NET: 30 mL    07 Jan 2022 07:01  -  08 Jan 2022 05:06  --------------------------------------------------------  IN: 810 mL / OUT: 600 mL / NET: 210 mL        Physical Exam:  General: NAD  CV: RRR  Lungs: CTA b/l, good air flow b/l   Abdomen: Soft, mildly-tender to palpation diffusely, softly distended, normoactive bowel sounds  Incision:  Midline vertical incision C/D/I, dressing in place  Ext: No pain or swelling     Labs:      MEDICATIONS  (STANDING):  acetaminophen   IVPB .. 1000 milliGRAM(s) IV Intermittent once  heparin   Injectable 5000 Unit(s) SubCutaneous every 8 hours  HYDROmorphone PCA (1 mG/mL) 30 milliLiter(s) PCA Continuous PCA Continuous  ketorolac   Injectable 30 milliGRAM(s) IV Push every 8 hours  lactated ringers. 1000 milliLiter(s) (150 mL/Hr) IV Continuous <Continuous>    MEDICATIONS  (PRN):  HYDROmorphone  Injectable 0.5 milliGRAM(s) IV Push every 15 minutes PRN Severe Pain (7 - 10)  HYDROmorphone PCA (1 mG/mL) Rescue Clinician Bolus 0.5 milliGRAM(s) IV Push every 15 minutes PRN for Pain Scale GREATER THAN 6  metoprolol tartrate Injectable 5 milliGRAM(s) IV Push every 6 hours PRN SBP>160, DBP>110  naloxone Injectable 0.1 milliGRAM(s) IV Push every 3 minutes PRN For ANY of the following changes in patient status:  A. RR LESS THAN 10 breaths per minute, B. Oxygen saturation LESS THAN 90%, C. Sedation score of 6  ondansetron Injectable 4 milliGRAM(s) IV Push every 6 hours PRN Nausea  ondansetron Injectable 4 milliGRAM(s) IV Push every 4 hours PRN Nausea and/or Vomiting  simethicone 80 milliGRAM(s) Chew every 6 hours PRN Gas       R2 GYN ONC Progress Note     Patient seen and examined at bedside.  No acute events overnight. No acute complaints.  Pain well controlled.  Patient is ambulating and tolerating clear diet.   Has not yet passed flatus. Gray is still in place.   Denies CP, SOB, N/V, fevers, and chills.    Vital Signs Last 24 Hours  T(C): 36.7 (01-08-22 @ 01:35), Max: 36.9 (01-07-22 @ 06:14)  HR: 52 (01-08-22 @ 01:35) (52 - 77)  BP: 101/45 (01-08-22 @ 01:35) (101/45 - 128/55)  RR: 17 (01-08-22 @ 01:35) (13 - 22)  SpO2: 98% (01-08-22 @ 01:35) (88% - 98%)    I&O's Summary    06 Jan 2022 07:01  -  07 Jan 2022 07:00  --------------------------------------------------------  IN: 30 mL / OUT: 0 mL / NET: 30 mL    07 Jan 2022 07:01  -  08 Jan 2022 05:06  --------------------------------------------------------  IN: 810 mL / OUT: 600 mL / NET: 210 mL        Physical Exam:  General: NAD  CV: RRR  Lungs: CTA b/l, good air flow b/l   Abdomen: Soft, mildly-tender to palpation diffusely, softly distended, normoactive bowel sounds  Incision:  Midline vertical incision C/D/I, dressing in place  Ext: No pain or swelling     Labs:      MEDICATIONS  (STANDING):  acetaminophen   IVPB .. 1000 milliGRAM(s) IV Intermittent once  heparin   Injectable 5000 Unit(s) SubCutaneous every 8 hours  HYDROmorphone PCA (1 mG/mL) 30 milliLiter(s) PCA Continuous PCA Continuous  ketorolac   Injectable 30 milliGRAM(s) IV Push every 8 hours  lactated ringers. 1000 milliLiter(s) (150 mL/Hr) IV Continuous <Continuous>    MEDICATIONS  (PRN):  HYDROmorphone  Injectable 0.5 milliGRAM(s) IV Push every 15 minutes PRN Severe Pain (7 - 10)  HYDROmorphone PCA (1 mG/mL) Rescue Clinician Bolus 0.5 milliGRAM(s) IV Push every 15 minutes PRN for Pain Scale GREATER THAN 6  metoprolol tartrate Injectable 5 milliGRAM(s) IV Push every 6 hours PRN SBP>160, DBP>110  naloxone Injectable 0.1 milliGRAM(s) IV Push every 3 minutes PRN For ANY of the following changes in patient status:  A. RR LESS THAN 10 breaths per minute, B. Oxygen saturation LESS THAN 90%, C. Sedation score of 6  ondansetron Injectable 4 milliGRAM(s) IV Push every 6 hours PRN Nausea  ondansetron Injectable 4 milliGRAM(s) IV Push every 4 hours PRN Nausea and/or Vomiting  simethicone 80 milliGRAM(s) Chew every 6 hours PRN Gas       GYN ONC PA Progress Note   POD#1    Patient seen and examined at bedside.  Overnight, patient with low UOP (50-60cc/hr), at that time martinez noted not to be draining well. Patient was given a 500cc bolus, martinez was removed and replaced around 1am. Since then, martinez has been draining well (UOP 70-80cc/hr). Also, overnight patient noted to have one episode of desats to 89% while asleep->placed on NC2L. NC 2L removed this am, O2 sats 99%. No acute complaints. Patient deniwes headache, dizziness, nausea, vomiting, chest pain, palpitations and sob.  Pain well controlled on PCA pump. Patient was OOB last night, ambulated well and tolerating clears. Patient has yet to pass flatus.       Vital Signs Last 24 Hours  T(C): 36.7 (01-08-22 @ 01:35), Max: 36.9 (01-07-22 @ 06:14)  HR: 52 (01-08-22 @ 01:35) (52 - 77)  BP: 101/45 (01-08-22 @ 01:35) (101/45 - 128/55)  RR: 17 (01-08-22 @ 01:35) (13 - 22)  SpO2: 98% (01-08-22 @ 01:35) (88% - 98%)    I&O's Summary    06 Jan 2022 07:01  -  07 Jan 2022 07:00  --------------------------------------------------------  IN: 30 mL / OUT: 0 mL / NET: 30 mL    07 Jan 2022 07:01  -  08 Jan 2022 05:06  --------------------------------------------------------  IN: 810 mL / OUT: 600 mL / NET: 210 mL        Physical Exam:  General: NAD  CV: RRR  Lungs: CTA b/l, good air flow b/l   Abdomen: Soft, non-distended, appropriately tender to palpation, normoactive bowel sounds  Incision:  Midline vertical incision C/D/I, dermabond prineo dressing in place, binder in place  Ext: No pain or swelling, bilat venodynes in place     Labs:      MEDICATIONS  (STANDING):  acetaminophen   IVPB .. 1000 milliGRAM(s) IV Intermittent once  heparin   Injectable 5000 Unit(s) SubCutaneous every 8 hours  HYDROmorphone PCA (1 mG/mL) 30 milliLiter(s) PCA Continuous PCA Continuous  ketorolac   Injectable 30 milliGRAM(s) IV Push every 8 hours  lactated ringers. 1000 milliLiter(s) (150 mL/Hr) IV Continuous <Continuous>    MEDICATIONS  (PRN):  HYDROmorphone  Injectable 0.5 milliGRAM(s) IV Push every 15 minutes PRN Severe Pain (7 - 10)  HYDROmorphone PCA (1 mG/mL) Rescue Clinician Bolus 0.5 milliGRAM(s) IV Push every 15 minutes PRN for Pain Scale GREATER THAN 6  metoprolol tartrate Injectable 5 milliGRAM(s) IV Push every 6 hours PRN SBP>160, DBP>110  naloxone Injectable 0.1 milliGRAM(s) IV Push every 3 minutes PRN For ANY of the following changes in patient status:  A. RR LESS THAN 10 breaths per minute, B. Oxygen saturation LESS THAN 90%, C. Sedation score of 6  ondansetron Injectable 4 milliGRAM(s) IV Push every 6 hours PRN Nausea  ondansetron Injectable 4 milliGRAM(s) IV Push every 4 hours PRN Nausea and/or Vomiting  simethicone 80 milliGRAM(s) Chew every 6 hours PRN Gas       GYN ONC PA Progress Note   POD#1    Patient seen and examined at bedside.  Overnight, patient with low UOP (50-60cc/hr), at that time martinez noted not to be draining well. Patient was given a 500cc bolus, martinez was removed and replaced around 1am. Since then, martinez has been draining well (UOP 70-80cc/hr). Also, overnight patient noted to have one episode of desats to 89% while asleep->placed on NC2L. NC 2L removed this am, O2 sats 99%. No acute complaints. Patient denies headache, dizziness, nausea, vomiting, chest pain, palpitations and sob.  Pain well controlled on PCA pump. Patient was OOB last night, ambulated well and tolerating clears. Patient has yet to pass flatus.       Vital Signs Last 24 Hours  T(C): 36.7 (01-08-22 @ 01:35), Max: 36.9 (01-07-22 @ 06:14)  HR: 52 (01-08-22 @ 01:35) (52 - 77)  BP: 101/45 (01-08-22 @ 01:35) (101/45 - 128/55)  RR: 17 (01-08-22 @ 01:35) (13 - 22)  SpO2: 98% (01-08-22 @ 01:35) (88% - 98%)    I&O's Summary    06 Jan 2022 07:01  -  07 Jan 2022 07:00  --------------------------------------------------------  IN: 30 mL / OUT: 0 mL / NET: 30 mL    07 Jan 2022 07:01  -  08 Jan 2022 05:06  --------------------------------------------------------  IN: 810 mL / OUT: 600 mL / NET: 210 mL        Physical Exam:  General: NAD  CV: RRR  Lungs: CTA b/l, good air flow b/l   Abdomen: Soft, non-distended, appropriately tender to palpation, normoactive bowel sounds  Incision:  Midline vertical incision C/D/I, dermabond prineo dressing in place, binder in place  Ext: No pain or swelling, bilat venodynes in place     Labs:      MEDICATIONS  (STANDING):  acetaminophen   IVPB .. 1000 milliGRAM(s) IV Intermittent once  heparin   Injectable 5000 Unit(s) SubCutaneous every 8 hours  HYDROmorphone PCA (1 mG/mL) 30 milliLiter(s) PCA Continuous PCA Continuous  ketorolac   Injectable 30 milliGRAM(s) IV Push every 8 hours  lactated ringers. 1000 milliLiter(s) (150 mL/Hr) IV Continuous <Continuous>    MEDICATIONS  (PRN):  HYDROmorphone  Injectable 0.5 milliGRAM(s) IV Push every 15 minutes PRN Severe Pain (7 - 10)  HYDROmorphone PCA (1 mG/mL) Rescue Clinician Bolus 0.5 milliGRAM(s) IV Push every 15 minutes PRN for Pain Scale GREATER THAN 6  metoprolol tartrate Injectable 5 milliGRAM(s) IV Push every 6 hours PRN SBP>160, DBP>110  naloxone Injectable 0.1 milliGRAM(s) IV Push every 3 minutes PRN For ANY of the following changes in patient status:  A. RR LESS THAN 10 breaths per minute, B. Oxygen saturation LESS THAN 90%, C. Sedation score of 6  ondansetron Injectable 4 milliGRAM(s) IV Push every 6 hours PRN Nausea  ondansetron Injectable 4 milliGRAM(s) IV Push every 4 hours PRN Nausea and/or Vomiting  simethicone 80 milliGRAM(s) Chew every 6 hours PRN Gas

## 2022-01-08 NOTE — PROVIDER CONTACT NOTE (OTHER) - ACTION/TREATMENT ORDERED:
Provider at bedside. Gray exchange in progress. Administer 500 mL bolus and continue to monitor output.

## 2022-01-09 ENCOUNTER — TRANSCRIPTION ENCOUNTER (OUTPATIENT)
Age: 47
End: 2022-01-09

## 2022-01-09 VITALS
RESPIRATION RATE: 18 BRPM | SYSTOLIC BLOOD PRESSURE: 119 MMHG | TEMPERATURE: 99 F | HEART RATE: 63 BPM | OXYGEN SATURATION: 99 % | DIASTOLIC BLOOD PRESSURE: 64 MMHG

## 2022-01-09 LAB
ANION GAP SERPL CALC-SCNC: 9 MMOL/L — SIGNIFICANT CHANGE UP (ref 7–14)
BASOPHILS # BLD AUTO: 0.07 K/UL — SIGNIFICANT CHANGE UP (ref 0–0.2)
BASOPHILS NFR BLD AUTO: 1.1 % — SIGNIFICANT CHANGE UP (ref 0–2)
BUN SERPL-MCNC: 13 MG/DL — SIGNIFICANT CHANGE UP (ref 7–23)
CALCIUM SERPL-MCNC: 8.9 MG/DL — SIGNIFICANT CHANGE UP (ref 8.4–10.5)
CHLORIDE SERPL-SCNC: 104 MMOL/L — SIGNIFICANT CHANGE UP (ref 98–107)
CO2 SERPL-SCNC: 25 MMOL/L — SIGNIFICANT CHANGE UP (ref 22–31)
CREAT SERPL-MCNC: 0.74 MG/DL — SIGNIFICANT CHANGE UP (ref 0.5–1.3)
EOSINOPHIL # BLD AUTO: 0.15 K/UL — SIGNIFICANT CHANGE UP (ref 0–0.5)
EOSINOPHIL NFR BLD AUTO: 2.4 % — SIGNIFICANT CHANGE UP (ref 0–6)
GLUCOSE SERPL-MCNC: 101 MG/DL — HIGH (ref 70–99)
HCT VFR BLD CALC: 36.4 % — SIGNIFICANT CHANGE UP (ref 34.5–45)
HGB BLD-MCNC: 12.2 G/DL — SIGNIFICANT CHANGE UP (ref 11.5–15.5)
IANC: 3.99 K/UL — SIGNIFICANT CHANGE UP (ref 1.5–8.5)
IMM GRANULOCYTES NFR BLD AUTO: 0.2 % — SIGNIFICANT CHANGE UP (ref 0–1.5)
LYMPHOCYTES # BLD AUTO: 1.43 K/UL — SIGNIFICANT CHANGE UP (ref 1–3.3)
LYMPHOCYTES # BLD AUTO: 23 % — SIGNIFICANT CHANGE UP (ref 13–44)
MAGNESIUM SERPL-MCNC: 1.9 MG/DL — SIGNIFICANT CHANGE UP (ref 1.6–2.6)
MCHC RBC-ENTMCNC: 31 PG — SIGNIFICANT CHANGE UP (ref 27–34)
MCHC RBC-ENTMCNC: 33.5 GM/DL — SIGNIFICANT CHANGE UP (ref 32–36)
MCV RBC AUTO: 92.4 FL — SIGNIFICANT CHANGE UP (ref 80–100)
MONOCYTES # BLD AUTO: 0.57 K/UL — SIGNIFICANT CHANGE UP (ref 0–0.9)
MONOCYTES NFR BLD AUTO: 9.2 % — SIGNIFICANT CHANGE UP (ref 2–14)
NEUTROPHILS # BLD AUTO: 3.99 K/UL — SIGNIFICANT CHANGE UP (ref 1.8–7.4)
NEUTROPHILS NFR BLD AUTO: 64.1 % — SIGNIFICANT CHANGE UP (ref 43–77)
NRBC # BLD: 0 /100 WBCS — SIGNIFICANT CHANGE UP
NRBC # FLD: 0 K/UL — SIGNIFICANT CHANGE UP
PHOSPHATE SERPL-MCNC: 2.4 MG/DL — LOW (ref 2.5–4.5)
PLATELET # BLD AUTO: 157 K/UL — SIGNIFICANT CHANGE UP (ref 150–400)
POTASSIUM SERPL-MCNC: 4 MMOL/L — SIGNIFICANT CHANGE UP (ref 3.5–5.3)
POTASSIUM SERPL-SCNC: 4 MMOL/L — SIGNIFICANT CHANGE UP (ref 3.5–5.3)
RBC # BLD: 3.94 M/UL — SIGNIFICANT CHANGE UP (ref 3.8–5.2)
RBC # FLD: 13 % — SIGNIFICANT CHANGE UP (ref 10.3–14.5)
SODIUM SERPL-SCNC: 138 MMOL/L — SIGNIFICANT CHANGE UP (ref 135–145)
WBC # BLD: 6.22 K/UL — SIGNIFICANT CHANGE UP (ref 3.8–10.5)
WBC # FLD AUTO: 6.22 K/UL — SIGNIFICANT CHANGE UP (ref 3.8–10.5)

## 2022-01-09 RX ORDER — ACETAMINOPHEN 500 MG
3 TABLET ORAL
Qty: 0 | Refills: 0 | DISCHARGE
Start: 2022-01-09

## 2022-01-09 RX ORDER — OXYCODONE HYDROCHLORIDE 5 MG/1
1 TABLET ORAL
Qty: 12 | Refills: 0
Start: 2022-01-09 | End: 2022-01-10

## 2022-01-09 RX ORDER — IBUPROFEN 200 MG
600 TABLET ORAL EVERY 6 HOURS
Refills: 0 | Status: DISCONTINUED | OUTPATIENT
Start: 2022-01-09 | End: 2022-01-09

## 2022-01-09 RX ORDER — SIMETHICONE 80 MG/1
1 TABLET, CHEWABLE ORAL
Qty: 0 | Refills: 0 | DISCHARGE
Start: 2022-01-09

## 2022-01-09 RX ORDER — IBUPROFEN 200 MG
1 TABLET ORAL
Qty: 40 | Refills: 0
Start: 2022-01-09 | End: 2022-01-18

## 2022-01-09 RX ADMIN — OXYCODONE HYDROCHLORIDE 5 MILLIGRAM(S): 5 TABLET ORAL at 10:47

## 2022-01-09 RX ADMIN — Medication 975 MILLIGRAM(S): at 10:34

## 2022-01-09 RX ADMIN — OXYCODONE HYDROCHLORIDE 5 MILLIGRAM(S): 5 TABLET ORAL at 08:01

## 2022-01-09 RX ADMIN — Medication 30 MILLIGRAM(S): at 05:18

## 2022-01-09 RX ADMIN — Medication 600 MILLIGRAM(S): at 12:25

## 2022-01-09 RX ADMIN — Medication 975 MILLIGRAM(S): at 02:11

## 2022-01-09 RX ADMIN — ENOXAPARIN SODIUM 40 MILLIGRAM(S): 100 INJECTION SUBCUTANEOUS at 07:02

## 2022-01-09 RX ADMIN — OXYCODONE HYDROCHLORIDE 5 MILLIGRAM(S): 5 TABLET ORAL at 11:47

## 2022-01-09 RX ADMIN — OXYCODONE HYDROCHLORIDE 5 MILLIGRAM(S): 5 TABLET ORAL at 07:02

## 2022-01-09 RX ADMIN — SODIUM CHLORIDE 3 MILLILITER(S): 9 INJECTION INTRAMUSCULAR; INTRAVENOUS; SUBCUTANEOUS at 05:15

## 2022-01-09 NOTE — DISCHARGE NOTE NURSING/CASE MANAGEMENT/SOCIAL WORK - NSDCPNINST_GEN_ALL_CORE
Maintain incision sites clean and dry, call MD with any signs of infection such as fever, redness or drainage from site. Avoid heavy lifting and strenuous activity, as well as constipation which may be a side effect from taking narcotic pain medication. Continue to wear abdominal binder for support. Continue to drink plenty of fluids to hydrate. Follow-up with surgeon in the office as instructed as well as with PMD for continuity of care.

## 2022-01-09 NOTE — DISCHARGE NOTE NURSING/CASE MANAGEMENT/SOCIAL WORK - NSDCPECAREGIVERED_GEN_ALL_CORE
Medline and carenotes for surgical procedure MUSA, oopharectomy, Oxycodone, Tylenol, Motrin, as well as DC Medications and side effects literature for patient reference.

## 2022-01-09 NOTE — PROGRESS NOTE ADULT - ASSESSMENT
46y POD#2 Ex-lap, Total abdominal hysterectomy, Bilateral salpingectomy, Left oophorectomy  (Frozen = benign) .  Patient is stable and doing well.       Neuro: pain well controlled on PO pain medication.   CV: Hemodynamically stable, h/o HTN, on home BP meds.   Pulm: Saturating well on room air this morning,  encourage ambulation  ID: afebrile  GI: c/w regular diet , c/w bowel regimen.   : Voiding spontaneously   Heme: Lovenox and SCDs for DVT ppx.   FEN: SLIV. f/u BMP-replete electrolytes prn   Dispo: Continue routine post-op care    Juan Ramon PGY2

## 2022-01-09 NOTE — PROGRESS NOTE ADULT - PROBLEM SELECTOR PLAN 1
GYN ONC Fellow Addendum:    Pt seen and examined at bedside. Agree with above. No acute events overnight.  Pain well controlled. Voiding spontaneously, +flatus no BM yet. OOB several times yesterday and this AM.    VS reviewed  Labs reviewed, H/H stable    Continue current pain regimen  ADAT, tolerating reg diet  Encourage ambulation and IS use  Replete lytes prn  DVT ppx: Lovenox  Dispo: meeting all post op milestones, consider d/c home this afternoon    Gia Stuart MD
GYN ONC Fellow Addendum:    Pt seen and examined at bedside. Agree with above. Overnight martinez occluded, flush attempted and unsuccessful martinez replaced follow by adequate UOP. Pt reports bladder pressure when martinez not draining now completely resolved. Otherwise feeling will this AM.    VS reviewed  Labs from this AM still pending, will f/u    Continue current pain regimen  ADAT  Encourage ambulation and IS use  Replete lytes prn  DVT ppx: SQH  Dispo: cont inpt post op care    Gia Stuart MD

## 2022-01-09 NOTE — DISCHARGE NOTE NURSING/CASE MANAGEMENT/SOCIAL WORK - NSDCPEFALRISK_GEN_ALL_CORE
For information on Fall & Injury Prevention, visit: [email or web address] OR  [email or web address] OR  https://www.cdc.gov/steadi/patient.html

## 2022-01-09 NOTE — PROGRESS NOTE ADULT - SUBJECTIVE AND OBJECTIVE BOX
R2 GYN ONC Progress Note     Patient seen and examined at bedside.  No acute events overnight. No acute complaints.  Pain well controlled on PO pain medication  Patient is ambulating and tolerating regular diet.  Patient is passing flatus.    Patient is voiding spontaneously.   Denies CP, SOB, N/V, fevers, and chills.    Vital Signs Last 24 Hours  T(C): 36.6 (01-09-22 @ 05:12), Max: 36.7 (01-08-22 @ 10:00)  HR: 73 (01-09-22 @ 05:12) (57 - 76)  BP: 105/53 (01-09-22 @ 05:12) (104/58 - 126/67)  RR: 18 (01-09-22 @ 05:12) (17 - 18)  SpO2: 98% (01-09-22 @ 05:12) (97% - 100%)    I&O's Summary    07 Jan 2022 07:01  -  08 Jan 2022 07:00  --------------------------------------------------------  IN: 810 mL / OUT: 850 mL / NET: -40 mL    08 Jan 2022 07:01  -  09 Jan 2022 06:27  --------------------------------------------------------  IN: 1150 mL / OUT: 1800 mL / NET: -650 mL        Physical Exam:  General: NAD  CV: RRR  Lungs: CTA b/l, good air flow b/l   Abdomen: Soft, mildly-tender to palpation diffusely, softly distended, normoactive bowel sounds  Incision:   Midline vertical incision C/D/I, dermabond prineo dressing in place, binder in place  Ext: No pain or swelling     Labs:                        11.3   9.24  )-----------( 164      ( 08 Jan 2022 09:30 )             34.3   baso 0.3    eos 0.2    imm gran 0.2    lymph 18.8   mono 7.6    poly 72.9       MEDICATIONS  (STANDING):  acetaminophen     Tablet .. 975 milliGRAM(s) Oral every 6 hours  enoxaparin Injectable 40 milliGRAM(s) SubCutaneous every 12 hours  ibuprofen  Tablet. 600 milliGRAM(s) Oral every 6 hours  losartan 100 milliGRAM(s) Oral daily  metoprolol succinate ER 50 milliGRAM(s) Oral daily  sodium chloride 0.9% lock flush 3 milliLiter(s) IV Push every 8 hours    MEDICATIONS  (PRN):  naloxone Injectable 0.1 milliGRAM(s) IV Push every 3 minutes PRN For ANY of the following changes in patient status:  A. RR LESS THAN 10 breaths per minute, B. Oxygen saturation LESS THAN 90%, C. Sedation score of 6  ondansetron Injectable 4 milliGRAM(s) IV Push every 4 hours PRN Nausea and/or Vomiting  oxyCODONE    IR 5 milliGRAM(s) Oral every 3 hours PRN Moderate Pain (4 - 6)  oxyCODONE    IR 10 milliGRAM(s) Oral every 3 hours PRN Severe Pain (7 - 10)  simethicone 80 milliGRAM(s) Chew every 6 hours PRN Gas

## 2022-01-09 NOTE — DISCHARGE NOTE NURSING/CASE MANAGEMENT/SOCIAL WORK - PATIENT PORTAL LINK FT
You can access the FollowMyHealth Patient Portal offered by [facility] by registering at the following website: [email or web address]. By joining [employer]’s FollowMyHealth portal, you will also be able to view your health information using other applications (apps) compatible with our system.

## 2022-01-09 NOTE — DISCHARGE NOTE NURSING/CASE MANAGEMENT/SOCIAL WORK - NSDPDISTO_GEN_ALL_CORE
Pt  with abominal incisions CDI, abdominal binder in place for support.  VS stable Afebrile. pt with positive bowel sounds salas po diet. Voiding without difficulty. Seen by MD and cleared for DC to home as per safe Dc plan./Home

## 2022-01-10 ENCOUNTER — NON-APPOINTMENT (OUTPATIENT)
Age: 47
End: 2022-01-10

## 2022-01-10 PROBLEM — E66.01 MORBID (SEVERE) OBESITY DUE TO EXCESS CALORIES: Chronic | Status: ACTIVE | Noted: 2021-12-21

## 2022-01-10 PROBLEM — Z86.69 PERSONAL HISTORY OF OTHER DISEASES OF THE NERVOUS SYSTEM AND SENSE ORGANS: Chronic | Status: ACTIVE | Noted: 2021-12-21

## 2022-01-10 PROBLEM — N85.02 ENDOMETRIAL INTRAEPITHELIAL NEOPLASIA [EIN]: Chronic | Status: ACTIVE | Noted: 2021-12-21

## 2022-01-10 PROBLEM — I49.9 CARDIAC ARRHYTHMIA, UNSPECIFIED: Chronic | Status: ACTIVE | Noted: 2021-09-01

## 2022-01-10 LAB — NON-GYNECOLOGICAL CYTOLOGY STUDY: SIGNIFICANT CHANGE UP

## 2022-01-18 LAB — SURGICAL PATHOLOGY STUDY: SIGNIFICANT CHANGE UP

## 2022-01-19 ENCOUNTER — APPOINTMENT (OUTPATIENT)
Dept: GYNECOLOGIC ONCOLOGY | Facility: CLINIC | Age: 47
End: 2022-01-19
Payer: COMMERCIAL

## 2022-01-19 VITALS — DIASTOLIC BLOOD PRESSURE: 61 MMHG | SYSTOLIC BLOOD PRESSURE: 101 MMHG | HEART RATE: 74 BPM | TEMPERATURE: 96.2 F

## 2022-01-19 PROCEDURE — 99024 POSTOP FOLLOW-UP VISIT: CPT

## 2022-01-19 RX ORDER — IBUPROFEN 600 MG/1
600 TABLET, FILM COATED ORAL
Qty: 40 | Refills: 0 | Status: ACTIVE | COMMUNITY
Start: 2022-01-09

## 2022-02-15 ENCOUNTER — NON-APPOINTMENT (OUTPATIENT)
Age: 47
End: 2022-02-15

## 2022-02-15 ENCOUNTER — APPOINTMENT (OUTPATIENT)
Dept: GYNECOLOGIC ONCOLOGY | Facility: CLINIC | Age: 47
End: 2022-02-15
Payer: COMMERCIAL

## 2022-02-15 ENCOUNTER — LABORATORY RESULT (OUTPATIENT)
Age: 47
End: 2022-02-15

## 2022-02-15 VITALS
WEIGHT: 284 LBS | SYSTOLIC BLOOD PRESSURE: 137 MMHG | HEIGHT: 67 IN | TEMPERATURE: 97.3 F | DIASTOLIC BLOOD PRESSURE: 81 MMHG | BODY MASS INDEX: 44.57 KG/M2 | HEART RATE: 86 BPM

## 2022-02-15 DIAGNOSIS — T81.89XA OTHER COMPLICATIONS OF PROCEDURES, NOT ELSEWHERE CLASSIFIED, INITIAL ENCOUNTER: ICD-10-CM

## 2022-02-15 PROCEDURE — 99024 POSTOP FOLLOW-UP VISIT: CPT

## 2022-02-16 ENCOUNTER — NON-APPOINTMENT (OUTPATIENT)
Age: 47
End: 2022-02-16

## 2022-02-16 ENCOUNTER — APPOINTMENT (OUTPATIENT)
Dept: GYNECOLOGIC ONCOLOGY | Facility: CLINIC | Age: 47
End: 2022-02-16
Payer: COMMERCIAL

## 2022-02-16 DIAGNOSIS — D25.9 LEIOMYOMA OF UTERUS, UNSPECIFIED: ICD-10-CM

## 2022-02-16 PROCEDURE — 99024 POSTOP FOLLOW-UP VISIT: CPT

## 2022-02-18 ENCOUNTER — APPOINTMENT (OUTPATIENT)
Dept: GYNECOLOGIC ONCOLOGY | Facility: CLINIC | Age: 47
End: 2022-02-18
Payer: COMMERCIAL

## 2022-02-18 VITALS
BODY MASS INDEX: 53.43 KG/M2 | HEART RATE: 54 BPM | HEIGHT: 61 IN | SYSTOLIC BLOOD PRESSURE: 121 MMHG | DIASTOLIC BLOOD PRESSURE: 81 MMHG | WEIGHT: 283 LBS

## 2022-02-18 DIAGNOSIS — T81.49XA INFECTION FOLLOWING A PROCEDURE, OTHER SURGICAL SITE, INITIAL ENCOUNTER: ICD-10-CM

## 2022-02-18 PROCEDURE — 99024 POSTOP FOLLOW-UP VISIT: CPT

## 2022-02-22 ENCOUNTER — APPOINTMENT (OUTPATIENT)
Dept: GYNECOLOGIC ONCOLOGY | Facility: CLINIC | Age: 47
End: 2022-02-22

## 2022-02-22 ENCOUNTER — NON-APPOINTMENT (OUTPATIENT)
Age: 47
End: 2022-02-22

## 2022-02-22 LAB — BACTERIA SPEC CULT: NORMAL

## 2022-02-28 ENCOUNTER — NON-APPOINTMENT (OUTPATIENT)
Age: 47
End: 2022-02-28

## 2022-03-09 ENCOUNTER — APPOINTMENT (OUTPATIENT)
Dept: GYNECOLOGIC ONCOLOGY | Facility: CLINIC | Age: 47
End: 2022-03-09
Payer: COMMERCIAL

## 2022-03-09 VITALS
TEMPERATURE: 98 F | WEIGHT: 283 LBS | DIASTOLIC BLOOD PRESSURE: 84 MMHG | SYSTOLIC BLOOD PRESSURE: 130 MMHG | BODY MASS INDEX: 53.43 KG/M2 | HEART RATE: 56 BPM | HEIGHT: 61 IN

## 2022-03-09 PROCEDURE — 99024 POSTOP FOLLOW-UP VISIT: CPT

## 2022-03-15 ENCOUNTER — NON-APPOINTMENT (OUTPATIENT)
Age: 47
End: 2022-03-15

## 2022-03-30 ENCOUNTER — APPOINTMENT (OUTPATIENT)
Dept: GYNECOLOGIC ONCOLOGY | Facility: CLINIC | Age: 47
End: 2022-03-30
Payer: COMMERCIAL

## 2022-03-30 VITALS
HEART RATE: 82 BPM | HEIGHT: 61 IN | SYSTOLIC BLOOD PRESSURE: 145 MMHG | TEMPERATURE: 98 F | BODY MASS INDEX: 53.43 KG/M2 | DIASTOLIC BLOOD PRESSURE: 82 MMHG | WEIGHT: 283 LBS

## 2022-03-30 PROCEDURE — 99024 POSTOP FOLLOW-UP VISIT: CPT

## 2022-04-13 ENCOUNTER — APPOINTMENT (OUTPATIENT)
Dept: GYNECOLOGIC ONCOLOGY | Facility: CLINIC | Age: 47
End: 2022-04-13
Payer: COMMERCIAL

## 2022-04-13 VITALS
SYSTOLIC BLOOD PRESSURE: 146 MMHG | HEART RATE: 78 BPM | HEIGHT: 61 IN | TEMPERATURE: 97.8 F | DIASTOLIC BLOOD PRESSURE: 76 MMHG

## 2022-04-13 PROCEDURE — 99213 OFFICE O/P EST LOW 20 MIN: CPT

## 2022-04-22 ENCOUNTER — APPOINTMENT (OUTPATIENT)
Dept: WOUND CARE | Facility: CLINIC | Age: 47
End: 2022-04-22
Payer: COMMERCIAL

## 2022-04-22 VITALS
RESPIRATION RATE: 16 BRPM | BODY MASS INDEX: 44.1 KG/M2 | TEMPERATURE: 98 F | HEART RATE: 66 BPM | HEIGHT: 67 IN | SYSTOLIC BLOOD PRESSURE: 124 MMHG | DIASTOLIC BLOOD PRESSURE: 84 MMHG | WEIGHT: 281 LBS

## 2022-04-22 PROCEDURE — 11042 DBRDMT SUBQ TIS 1ST 20SQCM/<: CPT

## 2022-04-22 PROCEDURE — 99204 OFFICE O/P NEW MOD 45 MIN: CPT | Mod: 25

## 2022-04-22 RX ORDER — OXYCODONE 5 MG/1
5 TABLET ORAL
Qty: 12 | Refills: 0 | Status: DISCONTINUED | COMMUNITY
Start: 2022-01-09 | End: 2022-04-22

## 2022-04-22 RX ORDER — TOBRAMYCIN 3 MG/ML
0.3 SOLUTION/ DROPS OPHTHALMIC
Qty: 5 | Refills: 0 | Status: DISCONTINUED | COMMUNITY
Start: 2021-12-15 | End: 2022-04-22

## 2022-04-22 RX ORDER — SULFAMETHOXAZOLE AND TRIMETHOPRIM 800; 160 MG/1; MG/1
800-160 TABLET ORAL TWICE DAILY
Qty: 14 | Refills: 0 | Status: DISCONTINUED | COMMUNITY
Start: 2022-02-15 | End: 2022-04-22

## 2022-04-22 NOTE — ASSESSMENT
[FreeTextEntry1] : 4-22-22:\par The patient presents with a wound to the abdomen after having a hysterectomy\par No clinical sign of infection\par is currently packing with alginate, but originally packed with iodoform\par \par On exam:  midline scar with open wound at inferior aspect.  No odor No s/s of infectiion,  Serosanguinous/sanguinous drainage\par s/p excisional debridement

## 2022-04-22 NOTE — HISTORY OF PRESENT ILLNESS
[FreeTextEntry1] : Ms. MURIEL WILLIAMSON   presents to the office with a wound since January 2022.  Pt had open hysterectomy Jan 7t, 2022 and wound dehisced on Feb 15, 2022 The wound is located on  the lower abdomen in the midline. The patient has complaints of non-healing tunneling wound and some pain.  The patient has been dressing the wound with first packing with iodoform  and now pack with alginate and covers with gauze and tegaderm.  The patient denies fevers or chills. The patient has localized pain to the wound upon dressing changes. 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

## 2022-04-22 NOTE — PLAN
[FreeTextEntry1] : 4-22-22\par Plan:\par Apply lidocaine or topical anesthetic if needed to reduce pain upon washing the wound.\par Wash wound with ----0.9% saline or Dove skin sensitive soap and clean water \par Pack ---- Drawtex packing\par Apply ----Aquacel and tegaderm\par Change dressing ---daily\par Barrier to periwound\par Optimization of nutrition.\par All questions answered.  \par \par -----Homecare orders given\par \par Follow up appointment scheduled for 1 week\par \par TeleHealth Services discussed with the patient and/or family.  Discharge instructions given including download of [facility] information regarding:\par 1)  [employer] [facility] to obtain medical records\par 2)  [employer] [facility] to conduct Face-to-Face TeleHealth visit\par 3)  [employer] for the Patient (patient takes a picture of their wound which is sent to the patient's chart for review)\par \par

## 2022-04-22 NOTE — PHYSICAL EXAM
[Alert] : alert [Oriented to Person] : oriented to person [Oriented to Place] : oriented to place [Oriented to Time] : oriented to time [Calm] : calm [Please See PDF for Tissue Analytics] : Please See PDF for [employer] Analytics. [Abdomen Tenderness] : ~T ~M No abdominal tenderness [de-identified] : nad [de-identified] : AT [de-identified] : supple [de-identified] : equal chest rise [de-identified] : FROM, walks without assistance [de-identified] : abdominal wound

## 2022-04-29 ENCOUNTER — APPOINTMENT (OUTPATIENT)
Dept: WOUND CARE | Facility: CLINIC | Age: 47
End: 2022-04-29
Payer: COMMERCIAL

## 2022-04-29 VITALS
HEART RATE: 50 BPM | DIASTOLIC BLOOD PRESSURE: 82 MMHG | BODY MASS INDEX: 44.01 KG/M2 | WEIGHT: 281 LBS | TEMPERATURE: 97.6 F | SYSTOLIC BLOOD PRESSURE: 150 MMHG | RESPIRATION RATE: 16 BRPM

## 2022-04-29 PROCEDURE — 99214 OFFICE O/P EST MOD 30 MIN: CPT

## 2022-04-29 NOTE — PHYSICAL EXAM
[Abdomen Tenderness] : ~T ~M No abdominal tenderness [Alert] : alert [Oriented to Person] : oriented to person [Oriented to Place] : oriented to place [Oriented to Time] : oriented to time [Calm] : calm [de-identified] : nad [de-identified] : AT [de-identified] : supple [de-identified] : equal chest rise [de-identified] : FROM, walks without assistance [de-identified] : abdominal wound [Please See PDF for Tissue Analytics] : Please See PDF for [employer] Analytics.

## 2022-04-29 NOTE — ASSESSMENT
[FreeTextEntry1] : 4-22-22:\par The patient presents with a wound to the abdomen after having a hysterectomy\par No clinical sign of infection\par is currently packing with alginate, but originally packed with iodoform\par \par On exam:  midline scar with open wound at inferior aspect.  No odor No s/s of infectiion,  Serosanguinous/sanguinous drainage\par s/p excisional debridement\par \par 4-29-22\par felling better. Went back to work\par No s/s of infection\par periwound candidiasis mild\par using drawtex, aquacel and tegaderm qd\par Wound irrigated with NS

## 2022-04-29 NOTE — PLAN
[FreeTextEntry1] : 4-22-22\par Plan:\par Apply lidocaine or topical anesthetic if needed to reduce pain upon washing the wound.\par Wash wound with ----0.9% saline or Dove skin sensitive soap and clean water \par Pack ---- Drawtex packing\par Apply ----Aquacel and tegaderm\par Change dressing ---daily\par Barrier to periwound\par Optimization of nutrition.\par All questions answered\par \par 4-29-22:\par  Plan:\par change to Aquacel rope gauze paper tape.  Antigungal to periwound prn\par f/u 2 weeks\par .  \par \par -----Homecare orders given\par \par Follow up appointment scheduled for 1 week\par \par TeleHealth Services discussed with the patient and/or family.  Discharge instructions given including download of [facility] information regarding:\par 1)  [employer] [facility] to obtain medical records\par 2)  [employer] [facility] [facility] to conduct Face-to-Face TeleHealth visit\par 3)  [employer] for the Patient (patient takes a picture of their wound which is sent to the patient's chart for review)\par \par

## 2022-05-02 ENCOUNTER — APPOINTMENT (OUTPATIENT)
Dept: GYNECOLOGIC ONCOLOGY | Facility: CLINIC | Age: 47
End: 2022-05-02
Payer: COMMERCIAL

## 2022-05-02 VITALS
SYSTOLIC BLOOD PRESSURE: 112 MMHG | HEART RATE: 73 BPM | WEIGHT: 280 LBS | DIASTOLIC BLOOD PRESSURE: 75 MMHG | HEIGHT: 67 IN | OXYGEN SATURATION: 97 % | BODY MASS INDEX: 43.95 KG/M2 | TEMPERATURE: 97.2 F

## 2022-05-02 DIAGNOSIS — Z48.89 ENCOUNTER FOR OTHER SPECIFIED SURGICAL AFTERCARE: ICD-10-CM

## 2022-05-02 PROCEDURE — 99212 OFFICE O/P EST SF 10 MIN: CPT

## 2022-05-13 ENCOUNTER — APPOINTMENT (OUTPATIENT)
Dept: WOUND CARE | Facility: CLINIC | Age: 47
End: 2022-05-13
Payer: COMMERCIAL

## 2022-05-13 VITALS — DIASTOLIC BLOOD PRESSURE: 66 MMHG | SYSTOLIC BLOOD PRESSURE: 103 MMHG | HEART RATE: 71 BPM

## 2022-05-13 VITALS — TEMPERATURE: 97.3 F

## 2022-05-13 PROCEDURE — 99213 OFFICE O/P EST LOW 20 MIN: CPT

## 2022-05-13 NOTE — PLAN
[FreeTextEntry1] : 4-22-22\par Plan:\par Apply lidocaine or topical anesthetic if needed to reduce pain upon washing the wound.\par Wash wound with ----0.9% saline or Dove skin sensitive soap and clean water \par Pack ---- Drawtex packing\par Apply ----Aquacel and tegaderm\par Change dressing ---daily\par Barrier to periwound\par Optimization of nutrition.\par All questions answered\par \par 4-29-22:\par  Plan:\par change to Aquacel rope gauze paper tape.  Antigungal to periwound prn\par f/u 2 weeks\par .  \par \par -----Homecare orders given\par \par Follow up appointment scheduled for 1 week\par \par TeleHealth Services discussed with the patient and/or family.  Discharge instructions given including download of [facility] information regarding:\par 1)  [employer] [facility] to obtain medical records\par 2)  [employer] [facility] to conduct Face-to-Face TeleHealth visit\par 3)  [employer] for the Patient (patient takes a picture of their wound which is sent to the patient's chart for review)\par \par 5/13/22:\par Plan: \par Cont aquacell packing and gauze QD\par Pt no longer has home care\par Supplies ordered.\par Will get CT abd/pelvis to evaluate area given non healing wound\par f/u 2 weeks

## 2022-05-13 NOTE — ASSESSMENT
[FreeTextEntry1] : 4-22-22:\par The patient presents with a wound to the abdomen after having a hysterectomy\par No clinical sign of infection\par is currently packing with alginate, but originally packed with iodoform\par \par On exam:  midline scar with open wound at inferior aspect.  No odor No s/s of infectiion,  Serosanguinous/sanguinous drainage\par s/p excisional debridement\par \par 4-29-22\par felling better. Went back to work\par No s/s of infection\par periwound candidiasis mild\par using drawtex, aquacel and tegaderm qd\par Wound irrigated with NS\par \par 5/13/22:\par Here for f/u.  no new complaints \par Feeling the same. tolerating fine at work. No s/s of infection. \par Zinc in periwound, abdominal wound filled with aquacel, gauze and tape. \par depth 3.2cm, undermining tunnel 5.5cm @12 o'clock\par Irrigated with saline. Wound repacked.  No s/s of infection\par No eessentail change in wound.  Still draining a lot\par

## 2022-05-19 PROBLEM — Z48.89 POSTOPERATIVE VISIT: Status: ACTIVE | Noted: 2022-05-19

## 2022-05-19 PROBLEM — Z48.89 POSTOPERATIVE VISIT: Status: RESOLVED | Noted: 2022-01-15 | Resolved: 2022-05-19

## 2022-05-24 PROBLEM — Z51.89 VISIT FOR WOUND CHECK: Status: ACTIVE | Noted: 2022-04-12

## 2022-05-25 ENCOUNTER — APPOINTMENT (OUTPATIENT)
Dept: GYNECOLOGIC ONCOLOGY | Facility: CLINIC | Age: 47
End: 2022-05-25
Payer: COMMERCIAL

## 2022-05-25 ENCOUNTER — NON-APPOINTMENT (OUTPATIENT)
Age: 47
End: 2022-05-25

## 2022-05-25 VITALS
TEMPERATURE: 97.5 F | BODY MASS INDEX: 43.95 KG/M2 | WEIGHT: 280 LBS | HEART RATE: 67 BPM | SYSTOLIC BLOOD PRESSURE: 139 MMHG | HEIGHT: 67 IN | DIASTOLIC BLOOD PRESSURE: 72 MMHG

## 2022-05-25 DIAGNOSIS — L08.9 OTHER INJURY OF UNSPECIFIED BODY REGION, INITIAL ENCOUNTER: ICD-10-CM

## 2022-05-25 DIAGNOSIS — T14.8XXA OTHER INJURY OF UNSPECIFIED BODY REGION, INITIAL ENCOUNTER: ICD-10-CM

## 2022-05-25 DIAGNOSIS — Z51.89 ENCOUNTER FOR OTHER SPECIFIED AFTERCARE: ICD-10-CM

## 2022-05-25 PROCEDURE — 99212 OFFICE O/P EST SF 10 MIN: CPT

## 2022-05-31 ENCOUNTER — NON-APPOINTMENT (OUTPATIENT)
Age: 47
End: 2022-05-31

## 2022-05-31 LAB
ALBUMIN SERPL ELPH-MCNC: 4.4 G/DL
ALP BLD-CCNC: 73 U/L
ALT SERPL-CCNC: 22 U/L
ANION GAP SERPL CALC-SCNC: 11 MMOL/L
AST SERPL-CCNC: 15 U/L
BASOPHILS # BLD AUTO: 0.1 K/UL
BASOPHILS NFR BLD AUTO: 1.1 %
BILIRUB SERPL-MCNC: 0.2 MG/DL
BUN SERPL-MCNC: 17 MG/DL
CALCIUM SERPL-MCNC: 9.6 MG/DL
CHLORIDE SERPL-SCNC: 103 MMOL/L
CO2 SERPL-SCNC: 24 MMOL/L
CREAT SERPL-MCNC: 0.77 MG/DL
EGFR: 96 ML/MIN/1.73M2
EOSINOPHIL # BLD AUTO: 0.13 K/UL
EOSINOPHIL NFR BLD AUTO: 1.5 %
GLUCOSE SERPL-MCNC: 67 MG/DL
HCT VFR BLD CALC: 39.6 %
HGB BLD-MCNC: 13.1 G/DL
IMM GRANULOCYTES NFR BLD AUTO: 0.2 %
LYMPHOCYTES # BLD AUTO: 2.53 K/UL
LYMPHOCYTES NFR BLD AUTO: 28.7 %
MAGNESIUM SERPL-MCNC: 2 MG/DL
MAN DIFF?: NORMAL
MCHC RBC-ENTMCNC: 29.5 PG
MCHC RBC-ENTMCNC: 33.1 GM/DL
MCV RBC AUTO: 89.2 FL
MONOCYTES # BLD AUTO: 0.82 K/UL
MONOCYTES NFR BLD AUTO: 9.3 %
NEUTROPHILS # BLD AUTO: 5.22 K/UL
NEUTROPHILS NFR BLD AUTO: 59.2 %
PLATELET # BLD AUTO: 287 K/UL
POTASSIUM SERPL-SCNC: 4.1 MMOL/L
PROT SERPL-MCNC: 6.7 G/DL
RBC # BLD: 4.44 M/UL
RBC # FLD: 12.1 %
SODIUM SERPL-SCNC: 139 MMOL/L
WBC # FLD AUTO: 8.82 K/UL

## 2022-06-01 ENCOUNTER — APPOINTMENT (OUTPATIENT)
Dept: GYNECOLOGIC ONCOLOGY | Facility: CLINIC | Age: 47
End: 2022-06-01

## 2022-06-01 VITALS
HEIGHT: 67 IN | BODY MASS INDEX: 43.95 KG/M2 | WEIGHT: 280 LBS | HEART RATE: 78 BPM | DIASTOLIC BLOOD PRESSURE: 84 MMHG | SYSTOLIC BLOOD PRESSURE: 131 MMHG | TEMPERATURE: 97.2 F

## 2022-06-01 PROCEDURE — 99024 POSTOP FOLLOW-UP VISIT: CPT | Mod: 1L

## 2022-06-08 ENCOUNTER — APPOINTMENT (OUTPATIENT)
Dept: WOUND CARE | Facility: CLINIC | Age: 47
End: 2022-06-08
Payer: COMMERCIAL

## 2022-06-08 VITALS
RESPIRATION RATE: 18 BRPM | HEART RATE: 73 BPM | TEMPERATURE: 97.6 F | SYSTOLIC BLOOD PRESSURE: 109 MMHG | DIASTOLIC BLOOD PRESSURE: 73 MMHG

## 2022-06-08 PROCEDURE — 99214 OFFICE O/P EST MOD 30 MIN: CPT

## 2022-06-08 NOTE — ASSESSMENT
[FreeTextEntry1] : 4-22-22:\par The patient presents with a wound to the abdomen after having a hysterectomy\par No clinical sign of infection\par is currently packing with alginate, but originally packed with iodoform\par \par On exam:  midline scar with open wound at inferior aspect.  No odor No s/s of infectiion,  Serosanguinous/sanguinous drainage\par s/p excisional debridement\par \par 4-29-22\par felling better. Went back to work\par No s/s of infection\par periwound candidiasis mild\par using drawtex, aquacel and tegaderm qd\par Wound irrigated with NS\par \par 5/13/22:\par Here for f/u.  no new complaints \par Feeling the same. tolerating fine at work. No s/s of infection. \par Zinc in periwound, abdominal wound filled with aquacel, gauze and tape. \par depth 3.2cm, undermining tunnel 5.5cm @12 o'clock\par Irrigated with saline. Wound repacked.  No s/s of infection\par No eessentail change in wound.  Still draining a lot\par \par 6-8-22:\par Pt here for f/u\par Pt has been following up with OBGYN.  Wound has been packed with 1/4in plain packing QD.\par Pt treated with abx for wound infection 2 weeks ago by OB GYN. No cx performed.\par Pt without complaints. Wound draining less.\par Pt for CT abd/pelvis 6-11-22.\par On exam, tissue visible red.  No odor. No s/s infection\par Less depth and undermining.\par \par

## 2022-06-08 NOTE — PLAN
[FreeTextEntry1] : 4-22-22\par Plan:\par Apply lidocaine or topical anesthetic if needed to reduce pain upon washing the wound.\par Wash wound with ----0.9% saline or Dove skin sensitive soap and clean water \par Pack ---- Drawtex packing\par Apply ----Aquacel and tegaderm\par Change dressing ---daily\par Barrier to periwound\par Optimization of nutrition.\par All questions answered\par \par 4-29-22:\par  Plan:\par change to Aquacel rope gauze paper tape.  Antigungal to periwound prn\par f/u 2 weeks\par .  \par \par -----Homecare orders given\par \par Follow up appointment scheduled for 1 week\par \par TeleHealth Services discussed with the patient and/or family.  Discharge instructions given including download of [facility] information regarding:\par 1)  [employer] [facility] to obtain medical records\par 2)  [employer] [facility] [facility] to conduct Face-to-Face TeleHealth visit\par 3)  [employer] for the Patient (patient takes a picture of their wound which is sent to the patient's chart for review)\par \par 5/13/22:\par Plan: \par Cont aquacell packing and gauze QD\par Pt no longer has home care\par Supplies ordered.\par Will get CT abd/pelvis to evaluate area given non healing wound\par f/u 2 weeks \par \par 6-8-22:\par Plan:\par Cleanse and irrigate wound with soap and water. \par Aquacell packing and gauze qd\par Pt to do dressing\par F/u Ct\par F/u 2 weeks.

## 2022-06-08 NOTE — PHYSICAL EXAM
[Abdomen Tenderness] : ~T ~M No abdominal tenderness [Alert] : alert [Oriented to Person] : oriented to person [Oriented to Place] : oriented to place [Oriented to Time] : oriented to time [Calm] : calm [de-identified] : nad [de-identified] : AT [de-identified] : supple [de-identified] : equal chest rise [de-identified] : FROM, walks without assistance [de-identified] : abdominal wound [Please See PDF for Tissue Analytics] : Please See PDF for [employer] Analytics.

## 2022-06-11 ENCOUNTER — OUTPATIENT (OUTPATIENT)
Dept: OUTPATIENT SERVICES | Facility: HOSPITAL | Age: 47
LOS: 1 days | End: 2022-06-11
Payer: COMMERCIAL

## 2022-06-11 ENCOUNTER — RESULT REVIEW (OUTPATIENT)
Age: 47
End: 2022-06-11

## 2022-06-11 ENCOUNTER — APPOINTMENT (OUTPATIENT)
Dept: CT IMAGING | Facility: IMAGING CENTER | Age: 47
End: 2022-06-11
Payer: COMMERCIAL

## 2022-06-11 DIAGNOSIS — D21.9 BENIGN NEOPLASM OF CONNECTIVE AND OTHER SOFT TISSUE, UNSPECIFIED: Chronic | ICD-10-CM

## 2022-06-11 DIAGNOSIS — Z86.79 PERSONAL HISTORY OF OTHER DISEASES OF THE CIRCULATORY SYSTEM: Chronic | ICD-10-CM

## 2022-06-11 DIAGNOSIS — Z87.19 PERSONAL HISTORY OF OTHER DISEASES OF THE DIGESTIVE SYSTEM: Chronic | ICD-10-CM

## 2022-06-11 DIAGNOSIS — Z98.89 OTHER SPECIFIED POSTPROCEDURAL STATES: Chronic | ICD-10-CM

## 2022-06-11 DIAGNOSIS — Z00.8 ENCOUNTER FOR OTHER GENERAL EXAMINATION: ICD-10-CM

## 2022-06-11 PROCEDURE — 74177 CT ABD & PELVIS W/CONTRAST: CPT

## 2022-06-11 PROCEDURE — 74177 CT ABD & PELVIS W/CONTRAST: CPT | Mod: 26

## 2022-06-15 ENCOUNTER — APPOINTMENT (OUTPATIENT)
Dept: GYNECOLOGIC ONCOLOGY | Facility: CLINIC | Age: 47
End: 2022-06-15
Payer: COMMERCIAL

## 2022-06-15 VITALS
WEIGHT: 280 LBS | DIASTOLIC BLOOD PRESSURE: 76 MMHG | SYSTOLIC BLOOD PRESSURE: 114 MMHG | HEART RATE: 70 BPM | HEIGHT: 67 IN | BODY MASS INDEX: 43.95 KG/M2

## 2022-06-15 DIAGNOSIS — T14.8XXA OTHER INJURY OF UNSPECIFIED BODY REGION, INITIAL ENCOUNTER: ICD-10-CM

## 2022-06-15 PROCEDURE — 99213 OFFICE O/P EST LOW 20 MIN: CPT

## 2022-06-15 NOTE — DISCUSSION/SUMMARY
[] : was  [Erythema] : was not erythematous [Ecchymosis] : was not ecchymotic [None] : had no drainage [Normal Skin] : normal appearance [Firm] : soft [Tender] : nontender [Abnormal Bowel Sounds] : normal bowel sounds [Guarding] : no guarding [Rebound] : no rebound tenderness [Incisional Hernia] : no incisional hernia [Mass] : no palpable mass [Vaginal Exam Abnormal] : normal vaginal exam [Doing Well] : is doing well [Excellent Pain Control] : has excellent pain control [No Sign of Infection] : is showing no signs of infection

## 2022-06-15 NOTE — REASON FOR VISIT
[Post Op] : post op visit [de-identified] : 1/7/2022 [de-identified] : MUSA-bilateral salpingectomy and left oophorectomy.  (Pt is 46 years old) [de-identified] : \par 2/18;22 visit:  3 x 3 cm with tunnel superiorly (packed with 1" packing).  VNS to follow.\par 3/9/22 visit:  2 cm wide opening; 7 cm deep at right side of wound.  Irrigation with sterile saline; packed with 1 inch packing and covered with sterile 4x4 gauze.  No s/s infection.  Healthy granulation tissue. \par 3/30/22 4 x 1 cm \par 5/2/22:  3.5 cm deep.  healthy granulation tissue.  No s/s infection.  Aqucell dressing. \par 5/25/22:  3.5 cm deep; tunnel at 12 o'clock to 5.0 cm.  Purulent drainage.  Reddened periwound area.  Pt states she has been applying zinc around the wound and cleaning with sterile saline.  Denies f/c.  \par 6/1/22:  5 cm deep.  Shane wound skin looks better, less red.  No purulent drainage.  Wound packed with 1/4 inch plain packing and covered with gauze 4x4 and paper tape.  Denies f/c/n/v/d. \par 6/15/22:  4 cm deep.  shane-wound no s/s infection.  No drainage.  Wound CDI.  No drainage.  \par \par Final Pathology:\par 1- Uterus, cervix, bilateral fallopian tubes,  and left ovary; total hysterectomy, bilateral salpingectomy  and left oophorectomy :\par - Secretory phase endometrium with extensive metaplastic changes\par - No residual atypical hyperplasia\par - Leiomyomata with degenerative changes\par - Adenomyosis\par - Endometriosis involving uterine serosa, cervix and left ovary\par - Unremarkable bilateral fallopian tubes\par - Evidence of prior treatment with emboli present\par \par Note:  Entire endometrium has been sampled and examined microscopically.  There is no residual atypical hyperplasia. Foci of glandular crowding with dilated and angulated  glands are noted but they are considered to be part of secretory phase endometrium.\par

## 2022-06-15 NOTE — ASSESSMENT
[FreeTextEntry1] : \par \par -  Wound improving (see HPI for assessment and measurements)\par -  Follow up in 1 month (will alternate with Dr. Rodriguez, and Natacha)\par -  Pt will also be following with wound care. \par -  Continue to use 1/4 inch plain packing daily.  \par -  Continue to ensure high protein intake and fluids.\par -  Pt verbalized understanding of plan and agrees.\par -  All questions answered.\par

## 2022-06-16 ENCOUNTER — NON-APPOINTMENT (OUTPATIENT)
Age: 47
End: 2022-06-16

## 2022-06-17 ENCOUNTER — NON-APPOINTMENT (OUTPATIENT)
Age: 47
End: 2022-06-17

## 2022-06-22 ENCOUNTER — APPOINTMENT (OUTPATIENT)
Dept: WOUND CARE | Facility: CLINIC | Age: 47
End: 2022-06-22
Payer: COMMERCIAL

## 2022-06-22 VITALS
HEIGHT: 67 IN | SYSTOLIC BLOOD PRESSURE: 92 MMHG | WEIGHT: 280 LBS | HEART RATE: 57 BPM | DIASTOLIC BLOOD PRESSURE: 62 MMHG | BODY MASS INDEX: 43.95 KG/M2 | TEMPERATURE: 97.2 F | RESPIRATION RATE: 18 BRPM

## 2022-06-22 PROCEDURE — 99214 OFFICE O/P EST MOD 30 MIN: CPT

## 2022-06-22 NOTE — ASSESSMENT
[FreeTextEntry1] : 4-22-22:\par The patient presents with a wound to the abdomen after having a hysterectomy\par No clinical sign of infection\par is currently packing with alginate, but originally packed with iodoform\par \par On exam:  midline scar with open wound at inferior aspect.  No odor No s/s of infectiion,  Serosanguinous/sanguinous drainage\par s/p excisional debridement\par \par 4-29-22\par felling better. Went back to work\par No s/s of infection\par periwound candidiasis mild\par using drawtex, aquacel and tegaderm qd\par Wound irrigated with NS\par \par 5/13/22:\par Here for f/u.  no new complaints \par Feeling the same. tolerating fine at work. No s/s of infection. \par Zinc in periwound, abdominal wound filled with aquacel, gauze and tape. \par depth 3.2cm, undermining tunnel 5.5cm @12 o'clock\par Irrigated with saline. Wound repacked.  No s/s of infection\par No eessentail change in wound.  Still draining a lot\par \par 6-8-22:\par Pt here for f/u\par Pt has been following up with OBGYN.  Wound has been packed with 1/4in plain packing QD.\par Pt treated with abx for wound infection 2 weeks ago by OB GYN. No cx performed.\par Pt without complaints. Wound draining less.\par Pt for CT abd/pelvis 6-11-22.\par On exam, tissue visible red.  No odor. No s/s infection\par Less depth and undermining.\par \par \par 6-22-22:\par No new complaints.  Pt feels there is less drainage\par CT results reviewed again.  All questions answered.\par No s/s of infection\par less undermining and depth\par \par

## 2022-06-22 NOTE — PLAN
[FreeTextEntry1] : 4-22-22\par Plan:\par Apply lidocaine or topical anesthetic if needed to reduce pain upon washing the wound.\par Wash wound with ----0.9% saline or Dove skin sensitive soap and clean water \par Pack ---- Drawtex packing\par Apply ----Aquacel and tegaderm\par Change dressing ---daily\par Barrier to periwound\par Optimization of nutrition.\par All questions answered\par \par 4-29-22:\par  Plan:\par change to Aquacel rope gauze paper tape.  Antigungal to periwound prn\par f/u 2 weeks\par .  \par \par -----Homecare orders given\par \par Follow up appointment scheduled for 1 week\par \par TeleHealth Services discussed with the patient and/or family.  Discharge instructions given including download of [facility] information regarding:\par 1)  [employer] [facility] to obtain medical records\par 2)  [employer] [facility] [facility] to conduct Face-to-Face TeleHealth visit\par 3)  [employer] for the Patient (patient takes a picture of their wound which is sent to the patient's chart for review)\par \par 5/13/22:\par Plan: \par Cont aquacell packing and gauze QD\par Pt no longer has home care\par Supplies ordered.\par Will get CT abd/pelvis to evaluate area given non healing wound\par f/u 2 weeks \par \par 6-8-22:\par Plan:\par Cleanse and irrigate wound with soap and water. \par Aquacell packing and gauze qd\par Pt to do dressing\par F/u Ct\par F/u 2 weeks.\par \par 6-22-22:\par Cleanse and irrigate wound with soap and water. \par Aquacel packing and gauze qd\par Pt to do dressing\par Supplies ordered\par F/u 2 weeks

## 2022-06-22 NOTE — PHYSICAL EXAM
[Alert] : alert [Oriented to Person] : oriented to person [Oriented to Place] : oriented to place [Oriented to Time] : oriented to time [Calm] : calm [Please See PDF for Tissue Analytics] : Please See PDF for [employer] Analytics. [Abdomen Tenderness] : ~T ~M No abdominal tenderness [de-identified] : nad [de-identified] : AT [de-identified] : supple [de-identified] : equal chest rise [de-identified] : FROM, walks without assistance [de-identified] : abdominal wound

## 2022-07-08 ENCOUNTER — APPOINTMENT (OUTPATIENT)
Dept: WOUND CARE | Facility: CLINIC | Age: 47
End: 2022-07-08

## 2022-07-08 VITALS
RESPIRATION RATE: 16 BRPM | DIASTOLIC BLOOD PRESSURE: 71 MMHG | TEMPERATURE: 97.4 F | HEART RATE: 64 BPM | SYSTOLIC BLOOD PRESSURE: 102 MMHG

## 2022-07-08 PROCEDURE — 99213 OFFICE O/P EST LOW 20 MIN: CPT

## 2022-07-08 NOTE — PHYSICAL EXAM
[Alert] : alert [Oriented to Place] : oriented to place [Oriented to Person] : oriented to person [Oriented to Time] : oriented to time [Calm] : calm [Please See PDF for Tissue Analytics] : Please See PDF for [employer]. [Abdomen Tenderness] : ~T ~M No abdominal tenderness [de-identified] : nad [de-identified] : AT [de-identified] : supple [de-identified] : equal chest rise [de-identified] : FROM, walks without assistance [de-identified] : abdominal wound

## 2022-07-08 NOTE — PLAN
[FreeTextEntry1] : 4-22-22\par Plan:\par Apply lidocaine or topical anesthetic if needed to reduce pain upon washing the wound.\par Wash wound with ----0.9% saline or Dove skin sensitive soap and clean water \par Pack ---- Drawtex packing\par Apply ----Aquacel and tegaderm\par Change dressing ---daily\par Barrier to periwound\par Optimization of nutrition.\par All questions answered\par \par \par \par 4-29-22:\par  Plan:\par change to Aquacel rope gauze paper tape.  Antigungal to periwound prn\par f/u 2 weeks\par .  \par \par -----Homecare orders given\par \par Follow up appointment scheduled for 1 week\par \par TeleHealth Services discussed with the patient and/or family.  Discharge instructions given including download of [facility] information regarding:\par 1)  [employer] [facility] to obtain medical records\par 2)  [employer] [facility] to conduct Face-to-Face TeleHealth visit\par 3)  [employer] for the Patient (patient takes a picture of their wound which is sent to the patient's chart for review)\par \par 5/13/22:\par Plan: \par Cont aquacell packing and gauze QD\par Pt no longer has home care\par Supplies ordered.\par Will get CT abd/pelvis to evaluate area given non healing wound\par f/u 2 weeks \par \par 6-8-22:\par Plan:\par Cleanse and irrigate wound with soap and water. \par Aquacell packing and gauze qd\par Pt to do dressing\par F/u Ct\par F/u 2 weeks.\par \par 6-22-22:\par Cleanse and irrigate wound with soap and water. \par Aquacel packing and gauze qd\par Pt to do dressing\par Supplies ordered\par F/u 2 weeks\par \par 7-8-22:\par Plan:\par cavilon to periwound.  Aquacel packing and gauze qd\par Pt to do dressing\par F/U 2 weeks

## 2022-07-08 NOTE — ASSESSMENT
[FreeTextEntry1] : 4-22-22:\par The patient presents with a wound to the abdomen after having a hysterectomy\par No clinical sign of infection\par is currently packing with alginate, but originally packed with iodoform\par \par On exam:  midline scar with open wound at inferior aspect.  No odor No s/s of infectiion,  Serosanguinous/sanguinous drainage\par s/p excisional debridement\par \par 4-29-22\par felling better. Went back to work\par No s/s of infection\par periwound candidiasis mild\par using drawtex, aquacel and tegaderm qd\par Wound irrigated with NS\par \par 5/13/22:\par Here for f/u.  no new complaints \par Feeling the same. tolerating fine at work. No s/s of infection. \par Zinc in periwound, abdominal wound filled with aquacel, gauze and tape. \par depth 3.2cm, undermining tunnel 5.5cm @12 o'clock\par Irrigated with saline. Wound repacked.  No s/s of infection\par No eessentail change in wound.  Still draining a lot\par \par 6-8-22:\par Pt here for f/u\par Pt has been following up with OBGYN.  Wound has been packed with 1/4in plain packing QD.\par Pt treated with abx for wound infection 2 weeks ago by OB GYN. No cx performed.\par Pt without complaints. Wound draining less.\par Pt for CT abd/pelvis 6-11-22.\par On exam, tissue visible red.  No odor. No s/s infection\par Less depth and undermining.\par 6-22-22:\par No new complaints.  Pt feels there is less drainage\par CT results reviewed again.  All questions answered.\par No s/s of infection\par less undermining and depth\par \par 7/8/22\par pt here for f/u \par drainage has decreased  depth improved --- now light serosanguinous drainage, base of wound not visible (nothing to pack), mild periwound maceration.  Wound healing as a divot\par no s/s of infection \par

## 2022-07-20 ENCOUNTER — APPOINTMENT (OUTPATIENT)
Dept: GYNECOLOGIC ONCOLOGY | Facility: CLINIC | Age: 47
End: 2022-07-20

## 2022-07-22 ENCOUNTER — APPOINTMENT (OUTPATIENT)
Dept: WOUND CARE | Facility: CLINIC | Age: 47
End: 2022-07-22

## 2022-07-22 VITALS
HEIGHT: 67 IN | HEART RATE: 85 BPM | DIASTOLIC BLOOD PRESSURE: 83 MMHG | SYSTOLIC BLOOD PRESSURE: 137 MMHG | WEIGHT: 284 LBS | BODY MASS INDEX: 44.57 KG/M2 | TEMPERATURE: 79.9 F

## 2022-07-22 PROCEDURE — 99214 OFFICE O/P EST MOD 30 MIN: CPT

## 2022-07-22 NOTE — PHYSICAL EXAM
[Alert] : alert [Oriented to Person] : oriented to person [Oriented to Place] : oriented to place [Oriented to Time] : oriented to time [Calm] : calm [Please See PDF for Tissue Analytics] : Please See PDF for [employer] Analytics. [Abdomen Tenderness] : ~T ~M No abdominal tenderness [de-identified] : nad [de-identified] : AT [de-identified] : supple [de-identified] : equal chest rise [de-identified] : FROM, walks without assistance [de-identified] : abdominal wound

## 2022-07-22 NOTE — ASSESSMENT
[FreeTextEntry1] : 4-22-22:\par The patient presents with a wound to the abdomen after having a hysterectomy\par No clinical sign of infection\par is currently packing with alginate, but originally packed with iodoform\par \par On exam:  midline scar with open wound at inferior aspect.  No odor No s/s of infectiion,  Serosanguinous/sanguinous drainage\par s/p excisional debridement\par \par 4-29-22\par felling better. Went back to work\par No s/s of infection\par periwound candidiasis mild\par using drawtex, aquacel and tegaderm qd\par Wound irrigated with NS\par \par 5/13/22:\par Here for f/u.  no new complaints \par Feeling the same. tolerating fine at work. No s/s of infection. \par Zinc in periwound, abdominal wound filled with aquacel, gauze and tape. \par depth 3.2cm, undermining tunnel 5.5cm @12 o'clock\par Irrigated with saline. Wound repacked.  No s/s of infection\par No eessentail change in wound.  Still draining a lot\par \par 6-8-22:\par Pt here for f/u\par Pt has been following up with OBGYN.  Wound has been packed with 1/4in plain packing QD.\par Pt treated with abx for wound infection 2 weeks ago by OB GYN. No cx performed.\par Pt without complaints. Wound draining less.\par Pt for CT abd/pelvis 6-11-22.\par On exam, tissue visible red.  No odor. No s/s infection\par Less depth and undermining.\par 6-22-22:\par No new complaints.  Pt feels there is less drainage\par CT results reviewed again.  All questions answered.\par No s/s of infection\par less undermining and depth\par \par 7/8/22\par pt here for f/u \par drainage has decreased  depth improved --- now light serosanguinous drainage, base of wound not visible (nothing to pack), mild periwound maceration.  Wound healing as a divot\par no s/s of infection \par \par \par 7-22-22:\par Here for f/u\par No new complaints\par Wound opended more\par Periwound candidiasis\par 2cm deep\par No periwound warmth, tenderness, induration,fluctuance or cepitus

## 2022-07-22 NOTE — PLAN
[FreeTextEntry1] : 4-22-22\par Plan:\par Apply lidocaine or topical anesthetic if needed to reduce pain upon washing the wound.\par Wash wound with ----0.9% saline or Dove skin sensitive soap and clean water \par Pack ---- Drawtex packing\par Apply ----Aquacel and tegaderm\par Change dressing ---daily\par Barrier to periwound\par Optimization of nutrition.\par All questions answered\par \par 7/22/22\par \par \par \par \par 4-29-22:\par  Plan:\par change to Aquacel rope gauze paper tape.  Antigungal to periwound prn\par f/u 2 weeks\par .  \par \par -----Homecare orders given\par \par Follow up appointment scheduled for 1 week\par \par TeleHealth Services discussed with the patient and/or family.  Discharge instructions given including download of [facility] information regarding:\par 1)  [employer] [facility] to obtain medical records\par 2)  [employer] [facility] to conduct Face-to-Face TeleHealth visit\par 3)  [employer] for the Patient (patient takes a picture of their wound which is sent to the patient's chart for review)\par \par 5/13/22:\par Plan: \par Cont aquacell packing and gauze QD\par Pt no longer has home care\par Supplies ordered.\par Will get CT abd/pelvis to evaluate area given non healing wound\par f/u 2 weeks \par \par 6-8-22:\par Plan:\par Cleanse and irrigate wound with soap and water. \par Aquacell packing and gauze qd\par Pt to do dressing\par F/u Ct\par F/u 2 weeks.\par \par 6-22-22:\par Cleanse and irrigate wound with soap and water. \par Aquacel packing and gauze qd\par Pt to do dressing\par Supplies ordered\par F/u 2 weeks\par \par 7-8-22:\par Plan:\par cavilon to periwound.  Aquacel packing and gauze qd\par Pt to do dressing\par F/U 2 weeks\par \par 7-22-22:\par Plan:\par OTC antifungal to periwound\par Cuca to base of wound followed by aquacel rope and gauze with tape QD\par supplies ordered f/u 2 weeks

## 2022-08-03 ENCOUNTER — APPOINTMENT (OUTPATIENT)
Dept: WOUND CARE | Facility: CLINIC | Age: 47
End: 2022-08-03

## 2022-08-03 VITALS — TEMPERATURE: 97.6 F

## 2022-08-03 PROCEDURE — 99214 OFFICE O/P EST MOD 30 MIN: CPT

## 2022-08-03 RX ORDER — SULFAMETHOXAZOLE AND TRIMETHOPRIM 800; 160 MG/1; MG/1
800-160 TABLET ORAL TWICE DAILY
Qty: 10 | Refills: 0 | Status: DISCONTINUED | COMMUNITY
Start: 2022-05-25 | End: 2022-08-03

## 2022-08-03 NOTE — ASSESSMENT
[FreeTextEntry1] : 4-22-22:\par The patient presents with a wound to the abdomen after having a hysterectomy\par No clinical sign of infection\par is currently packing with alginate, but originally packed with iodoform\par \par On exam:  midline scar with open wound at inferior aspect.  No odor No s/s of infectiion,  Serosanguinous/sanguinous drainage\par s/p excisional debridement\par \par 4-29-22\par felling better. Went back to work\par No s/s of infection\par periwound candidiasis mild\par using drawtex, aquacel and tegaderm qd\par Wound irrigated with NS\par \par 5/13/22:\par Here for f/u.  no new complaints \par Feeling the same. tolerating fine at work. No s/s of infection. \par Zinc in periwound, abdominal wound filled with aquacel, gauze and tape. \par depth 3.2cm, undermining tunnel 5.5cm @12 o'clock\par Irrigated with saline. Wound repacked.  No s/s of infection\par No eessentail change in wound.  Still draining a lot\par \par 6-8-22:\par Pt here for f/u\par Pt has been following up with OBGYN.  Wound has been packed with 1/4in plain packing QD.\par Pt treated with abx for wound infection 2 weeks ago by OB GYN. No cx performed.\par Pt without complaints. Wound draining less.\par Pt for CT abd/pelvis 6-11-22.\par On exam, tissue visible red.  No odor. No s/s infection\par Less depth and undermining.\par 6-22-22:\par No new complaints.  Pt feels there is less drainage\par CT results reviewed again.  All questions answered.\par No s/s of infection\par less undermining and depth\par \par 7/8/22\par pt here for f/u \par drainage has decreased  depth improved --- now light serosanguinous drainage, base of wound not visible (nothing to pack), mild periwound maceration.  Wound healing as a divot\par no s/s of infection \par \par \par 7-22-22:\par Here for f/u\par No new complaints\par Wound opended more\par Periwound candidiasis\par 2cm deep\par No periwound warmth, tenderness, induration,fluctuance or cepitus\par \par 8-3-22:\par Here for f/u \par No new complaints\par No change in drainage as per patient.  No improvement noticed as per pt\par periwound candidiasis resolved\par 2cm deep\par No periwound warmth, tenderness, induration,fluctuance or crepitus

## 2022-08-03 NOTE — PHYSICAL EXAM
[Alert] : alert [Oriented to Person] : oriented to person [Oriented to Place] : oriented to place [Oriented to Time] : oriented to time [Calm] : calm [Please See PDF for Tissue Analytics] : Please See PDF for [employer] Analytics. [Abdomen Tenderness] : ~T ~M No abdominal tenderness [de-identified] : nad [de-identified] : AT [de-identified] : supple [de-identified] : equal chest rise [de-identified] : FROM, walks without assistance [de-identified] : abdominal wound

## 2022-08-03 NOTE — PLAN
[FreeTextEntry1] : 4-22-22\par Plan:\par Apply lidocaine or topical anesthetic if needed to reduce pain upon washing the wound.\par Wash wound with ----0.9% saline or Dove skin sensitive soap and clean water \par Pack ---- Drawtex packing\par Apply ----Aquacel and tegaderm\par Change dressing ---daily\par Barrier to periwound\par Optimization of nutrition.\par All questions answered\par \par 7/22/22\par \par \par \par \par 4-29-22:\par  Plan:\par change to Aquacel rope gauze paper tape.  Antigungal to periwound prn\par f/u 2 weeks\par .  \par \par -----Homecare orders given\par \par Follow up appointment scheduled for 1 week\par \par TeleHealth Services discussed with the patient and/or family.  Discharge instructions given including download of [facility] information regarding:\par 1)  [employer] [facility] to obtain medical records\par 2)  [employer] [facility] to conduct Face-to-Face TeleHealth visit\par 3)  [employer] for the Patient (patient takes a picture of their wound which is sent to the patient's chart for review)\par \par 5/13/22:\par Plan: \par Cont aquacell packing and gauze QD\par Pt no longer has home care\par Supplies ordered.\par Will get CT abd/pelvis to evaluate area given non healing wound\par f/u 2 weeks \par \par 6-8-22:\par Plan:\par Cleanse and irrigate wound with soap and water. \par Aquacell packing and gauze qd\par Pt to do dressing\par F/u Ct\par F/u 2 weeks.\par \par 6-22-22:\par Cleanse and irrigate wound with soap and water. \par Aquacel packing and gauze qd\par Pt to do dressing\par Supplies ordered\par F/u 2 weeks\par \par 7-8-22:\par Plan:\par cavilon to periwound.  Aquacel packing and gauze qd\par Pt to do dressing\par F/U 2 weeks\par \par 7-22-22:\par Plan:\par OTC antifungal to periwound\par Cuca to base of wound followed by aquacel rope and gauze with tape QD\par supplies ordered f/u 2 weeks\par \par 8-3-22:\par Plan:\par chronic non healing wound\par will change dressing--Cuca to base of wound followed by drawtex rope and EO2 patch QOD\par EO2 ordered.  Pt has other suppies\par all questions answered\par F/u 2 weeks

## 2022-08-03 NOTE — PHYSICAL EXAM
[Alert] : alert [Oriented to Person] : oriented to person [Oriented to Place] : oriented to place [Oriented to Time] : oriented to time [Calm] : calm [Please See PDF for Tissue Analytics] : Please See PDF for [employer] Analytics. [Abdomen Tenderness] : ~T ~M No abdominal tenderness [de-identified] : nad [de-identified] : AT [de-identified] : supple [de-identified] : equal chest rise [de-identified] : FROM, walks without assistance [de-identified] : abdominal wound

## 2022-08-03 NOTE — PLAN
[FreeTextEntry1] : 4-22-22\par Plan:\par Apply lidocaine or topical anesthetic if needed to reduce pain upon washing the wound.\par Wash wound with ----0.9% saline or Dove skin sensitive soap and clean water \par Pack ---- Drawtex packing\par Apply ----Aquacel and tegaderm\par Change dressing ---daily\par Barrier to periwound\par Optimization of nutrition.\par All questions answered\par \par 7/22/22\par \par \par \par \par 4-29-22:\par  Plan:\par change to Aquacel rope gauze paper tape.  Antigungal to periwound prn\par f/u 2 weeks\par .  \par \par -----Homecare orders given\par \par Follow up appointment scheduled for 1 week\par \par TeleHealth Services discussed with the patient and/or family.  Discharge instructions given including download of [facility] information regarding:\par 1)  [employer] [facility] to obtain medical records\par 2)  [employer] [facility] to conduct Face-to-Face TeleHealth visit\par 3)  [employer] [facility] the Patient (patient takes a picture of their wound which is sent to the patient's chart for review)\par \par 5/13/22:\par Plan: \par Cont aquacell packing and gauze QD\par Pt no longer has home care\par Supplies ordered.\par Will get CT abd/pelvis to evaluate area given non healing wound\par f/u 2 weeks \par \par 6-8-22:\par Plan:\par Cleanse and irrigate wound with soap and water. \par Aquacell packing and gauze qd\par Pt to do dressing\par F/u Ct\par F/u 2 weeks.\par \par 6-22-22:\par Cleanse and irrigate wound with soap and water. \par Aquacel packing and gauze qd\par Pt to do dressing\par Supplies ordered\par F/u 2 weeks\par \par 7-8-22:\par Plan:\par cavilon to periwound.  Aquacel packing and gauze qd\par Pt to do dressing\par F/U 2 weeks\par \par 7-22-22:\par Plan:\par OTC antifungal to periwound\par Cuca to base of wound followed by aquacel rope and gauze with tape QD\par supplies ordered f/u 2 weeks\par \par 8-3-22:\par Plan:\par chronic non healing wound\par will change dressing--Cuca to base of wound followed by drawtex rope and EO2 patch QOD\par EO2 ordered.  Pt has other suppies\par all questions answered\par F/u 2 weeks

## 2022-08-17 ENCOUNTER — APPOINTMENT (OUTPATIENT)
Dept: WOUND CARE | Facility: CLINIC | Age: 47
End: 2022-08-17

## 2022-08-17 DIAGNOSIS — Z87.828 PERSONAL HISTORY OF OTHER (HEALED) PHYSICAL INJURY AND TRAUMA: ICD-10-CM

## 2022-08-17 DIAGNOSIS — S31.109A UNSPECIFIED OPEN WOUND OF ABDOMINAL WALL, UNSPECIFIED QUADRANT W/OUT PENETRATION INTO PERITONEAL CAVITY, INITIAL ENCOUNTER: ICD-10-CM

## 2022-08-17 PROCEDURE — 99213 OFFICE O/P EST LOW 20 MIN: CPT

## 2022-08-17 NOTE — ASSESSMENT
[FreeTextEntry1] : 4-22-22:\par The patient presents with a wound to the abdomen after having a hysterectomy\par No clinical sign of infection\par is currently packing with alginate, but originally packed with iodoform\par \par On exam:  midline scar with open wound at inferior aspect.  No odor No s/s of infectiion,  Serosanguinous/sanguinous drainage\par s/p excisional debridement\par \par 4-29-22\par felling better. Went back to work\par No s/s of infection\par periwound candidiasis mild\par using drawtex, aquacel and tegaderm qd\par Wound irrigated with NS\par \par 5/13/22:\par Here for f/u.  no new complaints \par Feeling the same. tolerating fine at work. No s/s of infection. \par Zinc in periwound, abdominal wound filled with aquacel, gauze and tape. \par depth 3.2cm, undermining tunnel 5.5cm @12 o'clock\par Irrigated with saline. Wound repacked.  No s/s of infection\par No eessentail change in wound.  Still draining a lot\par \par 6-8-22:\par Pt here for f/u\par Pt has been following up with OBGYN.  Wound has been packed with 1/4in plain packing QD.\par Pt treated with abx for wound infection 2 weeks ago by OB GYN. No cx performed.\par Pt without complaints. Wound draining less.\par Pt for CT abd/pelvis 6-11-22.\par On exam, tissue visible red.  No odor. No s/s infection\par Less depth and undermining.\par 6-22-22:\par No new complaints.  Pt feels there is less drainage\par CT results reviewed again.  All questions answered.\par No s/s of infection\par less undermining and depth\par \par 7/8/22\par pt here for f/u \par drainage has decreased  depth improved --- now light serosanguinous drainage, base of wound not visible (nothing to pack), mild periwound maceration.  Wound healing as a divot\par no s/s of infection \par \par \par 7-22-22:\par Here for f/u\par No new complaints\par Wound opended more\par Periwound candidiasis\par 2cm deep\par No periwound warmth, tenderness, induration,fluctuance or cepitus\par \par 8-3-22:\par Here for f/u \par No new complaints\par No change in drainage as per patient.  No improvement noticed as per pt\par periwound candidiasis resolved\par 2cm deep\par No periwound warmth, tenderness, induration,fluctuance or crepitus\par \par 8-17-22:\par Pt here for follow up\par Pt states she just started using the EO2 today\par Pt says the dressing and wound have had no drainage for 4 days.\par On exam:\par site of abdominal wound without drainage.  Upon inspection no opening seen--Fully epithelailized.  Healed as a divot. \par No s/s of infection

## 2022-08-17 NOTE — PLAN
[FreeTextEntry1] : 4-22-22\par Plan:\par Apply lidocaine or topical anesthetic if needed to reduce pain upon washing the wound.\par Wash wound with ----0.9% saline or Dove skin sensitive soap and clean water \par Pack ---- Drawtex packing\par Apply ----Aquacel and tegaderm\par Change dressing ---daily\par Barrier to periwound\par Optimization of nutrition.\par All questions answered\par \par 7/22/22\par \par \par \par \par 4-29-22:\par  Plan:\par change to Aquacel rope gauze paper tape.  Antigungal to periwound prn\par f/u 2 weeks\par .  \par \par -----Homecare orders given\par \par Follow up appointment scheduled for 1 week\par \par TeleHealth Services discussed with the patient and/or family.  Discharge instructions given including download of [facility] information regarding:\par 1)  [employer] [facility] to obtain medical records\par 2)  [employer] [facility] to conduct Face-to-Face TeleHealth visit\par 3)  [employer] [facility] the Patient (patient takes a picture of their wound which is sent to the patient's chart for review)\par \par 5/13/22:\par Plan: \par Cont aquacell packing and gauze QD\par Pt no longer has home care\par Supplies ordered.\par Will get CT abd/pelvis to evaluate area given non healing wound\par f/u 2 weeks \par \par 6-8-22:\par Plan:\par Cleanse and irrigate wound with soap and water. \par Aquacell packing and gauze qd\par Pt to do dressing\par F/u Ct\par F/u 2 weeks.\par \par 6-22-22:\par Cleanse and irrigate wound with soap and water. \par Aquacel packing and gauze qd\par Pt to do dressing\par Supplies ordered\par F/u 2 weeks\par \par 7-8-22:\par Plan:\par cavilon to periwound.  Aquacel packing and gauze qd\par Pt to do dressing\par F/U 2 weeks\par \par 7-22-22:\par Plan:\par OTC antifungal to periwound\par Cuca to base of wound followed by aquacel rope and gauze with tape QD\par supplies ordered f/u 2 weeks\par \par 8-3-22:\par Plan:\par chronic non healing wound\par will change dressing--Cuca to base of wound followed by drawtex rope and EO2 patch QOD\par EO2 ordered.  Pt has other suppies\par all questions answered\par F/u 2 weeks\par \par 8-17-22:\par Plan:\par abdominal wound healed.\par Keep area clean and dry.  avoid moisture developing in healed wound\par F/U prn

## 2022-08-17 NOTE — PHYSICAL EXAM
[Abdomen Tenderness] : ~T ~M No abdominal tenderness [Alert] : alert [Oriented to Person] : oriented to person [Oriented to Place] : oriented to place [Oriented to Time] : oriented to time [Calm] : calm [de-identified] : nad [de-identified] : AT [de-identified] : supple [de-identified] : equal chest rise [de-identified] : FROM, walks without assistance [de-identified] : abdominal wound [Please See PDF for Tissue Analytics] : Please See PDF for [employer] Analytics.

## 2022-08-18 PROBLEM — Z87.828 HEALED WOUND: Status: ACTIVE | Noted: 2022-08-18

## 2022-08-18 PROBLEM — S31.109A OPEN ABDOMINAL WALL WOUND: Status: ACTIVE | Noted: 2022-04-22

## 2022-09-19 PROBLEM — N93.9 ABNORMAL UTERINE BLEEDING (AUB): Status: ACTIVE | Noted: 2021-10-26

## 2022-09-21 ENCOUNTER — APPOINTMENT (OUTPATIENT)
Dept: GYNECOLOGIC ONCOLOGY | Facility: CLINIC | Age: 47
End: 2022-09-21

## 2022-09-21 ENCOUNTER — NON-APPOINTMENT (OUTPATIENT)
Age: 47
End: 2022-09-21

## 2022-09-21 VITALS
DIASTOLIC BLOOD PRESSURE: 85 MMHG | BODY MASS INDEX: 45.52 KG/M2 | SYSTOLIC BLOOD PRESSURE: 132 MMHG | WEIGHT: 290 LBS | HEART RATE: 58 BPM | HEIGHT: 67 IN

## 2022-09-21 DIAGNOSIS — N93.9 ABNORMAL UTERINE AND VAGINAL BLEEDING, UNSPECIFIED: ICD-10-CM

## 2022-09-21 PROCEDURE — 99213 OFFICE O/P EST LOW 20 MIN: CPT

## 2022-10-03 ENCOUNTER — APPOINTMENT (OUTPATIENT)
Dept: OBGYN | Facility: CLINIC | Age: 47
End: 2022-10-03

## 2022-10-03 VITALS
HEIGHT: 67 IN | HEART RATE: 65 BPM | RESPIRATION RATE: 17 BRPM | WEIGHT: 293 LBS | OXYGEN SATURATION: 99 % | DIASTOLIC BLOOD PRESSURE: 93 MMHG | BODY MASS INDEX: 45.99 KG/M2 | SYSTOLIC BLOOD PRESSURE: 156 MMHG

## 2022-10-03 DIAGNOSIS — N85.02 ENDOMETRIAL INTRAEPITHELIAL NEOPLASIA [EIN]: ICD-10-CM

## 2022-10-03 DIAGNOSIS — Z01.419 ENCOUNTER FOR GYNECOLOGICAL EXAMINATION (GENERAL) (ROUTINE) W/OUT ABNORMAL FINDINGS: ICD-10-CM

## 2022-10-03 DIAGNOSIS — Z12.39 ENCOUNTER FOR OTHER SCREENING FOR MALIGNANT NEOPLASM OF BREAST: ICD-10-CM

## 2022-10-03 PROCEDURE — 99396 PREV VISIT EST AGE 40-64: CPT

## 2022-10-03 NOTE — PHYSICAL EXAM
[Chaperone Present] : A chaperone was present in the examining room during all aspects of the physical examination [Appropriately responsive] : appropriately responsive [Alert] : alert [No Acute Distress] : no acute distress [No Lymphadenopathy] : no lymphadenopathy [Regular Rate Rhythm] : regular rate rhythm [Soft] : soft [Non-tender] : non-tender [Non-distended] : non-distended [No Mass] : no mass [Oriented x3] : oriented x3 [Examination Of The Breasts] : a normal appearance [No Masses] : no breast masses were palpable [Labia Majora] : normal [Labia Minora] : normal [Normal] : normal [Absent] : absent [Uterine Adnexae] : absent

## 2022-10-03 NOTE — PROCEDURE
[Vaginal Pap Smear] : vaginal Pap smear [Liquid Base] : liquid base [Tolerated Well] : the patient tolerated the procedure well [No Complications] : there were no complications [de-identified] : hr hpv

## 2022-10-03 NOTE — HISTORY OF PRESENT ILLNESS
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

## 2022-10-04 LAB — HPV HIGH+LOW RISK DNA PNL CVX: NOT DETECTED

## 2022-10-07 LAB — CYTOLOGY CVX/VAG DOC THIN PREP: NORMAL

## 2022-10-11 NOTE — ASU PATIENT PROFILE, ADULT - NS PRO PT RIGHT SUPPORT PERSON
[facility]     Progress Note    Patient Name: Lauren Redd  : 1945  MRN: [id number]  Primary Care Physician:  Dennis Kohler MD  Date of admission: 10/1/2022      Subjective   Brief summary.  Admitted to ICU with sepsis and anemia and cellulitis of legs      HPI:  Pain better today  Heart rate better  Denies abdominal pain or blood in the stools        Review of Systems     Fatigue and weakness  Leg pain  No marylin blood in the stool      Objective     Vitals:   Temp:  [97.2 °F (36.2 °C)-98.2 °F (36.8 °C)] 97.2 °F (36.2 °C)  Heart Rate:  [106-122] 122  Resp:  [18-20] 20  BP: ()/(61-75) 111/75    Physical Exam :     Elderly female, sitting upright in bed  HEENT with pallor..  No icterus  Heart irregular, lungs clear but diminished breath sound  Abdomen obese and soft nontender  Extremities with 3+ to 4+ edema with extensive ulceration and drainage.  Open ulcers in both legs      Result Review:  I have personally reviewed the results from the time of this admission to 10/11/2022 15:51 EDT and agree with these findings:  [x]  Laboratory  []  Microbiology  []  Radiology  []  EKG/Telemetry   []  Cardiology/Vascular   []  Pathology  []  Old records  []  Other:         Assessment / Plan       Active Hospital Problems:  Active Hospital Problems    Diagnosis    • **Sepsis ([facility])    • Acute kidney injury superimposed on chronic kidney disease (HCC)    • Severe anemia    • GI bleed    • CHF (congestive heart failure) ([facility])    • Cellulitis of anterior lower leg    • Atrial fibrillation with RVR ([facility])        Plan:   Increased pain meds helped patient.  Pain and heart rate better  Hemoglobin stable  Patient improving, plan to discharge her to nursing home when bed available for inpatient rehab        DVT prophylaxis:  Medical DVT prophylaxis orders are present.    CODE STATUS:   Code Status (Patient has no pulse and is not breathing): CPR (Attempt to Resuscitate)  Medical Interventions (Patient has pulse or  is breathing): Full Support  Release to patient: Routine Release              Electronically signed by Pablo Fajardo MD, 10/11/22, 3:53 PM EDT.              Declines

## 2022-10-19 ENCOUNTER — RESULT REVIEW (OUTPATIENT)
Age: 47
End: 2022-10-19

## 2022-10-19 ENCOUNTER — OUTPATIENT (OUTPATIENT)
Dept: OUTPATIENT SERVICES | Facility: HOSPITAL | Age: 47
LOS: 1 days | End: 2022-10-19
Payer: COMMERCIAL

## 2022-10-19 ENCOUNTER — APPOINTMENT (OUTPATIENT)
Dept: MAMMOGRAPHY | Facility: IMAGING CENTER | Age: 47
End: 2022-10-19

## 2022-10-19 ENCOUNTER — APPOINTMENT (OUTPATIENT)
Dept: ULTRASOUND IMAGING | Facility: IMAGING CENTER | Age: 47
End: 2022-10-19

## 2022-10-19 DIAGNOSIS — Z86.79 PERSONAL HISTORY OF OTHER DISEASES OF THE CIRCULATORY SYSTEM: Chronic | ICD-10-CM

## 2022-10-19 DIAGNOSIS — Z12.39 ENCOUNTER FOR OTHER SCREENING FOR MALIGNANT NEOPLASM OF BREAST: ICD-10-CM

## 2022-10-19 DIAGNOSIS — Z98.89 OTHER SPECIFIED POSTPROCEDURAL STATES: Chronic | ICD-10-CM

## 2022-10-19 DIAGNOSIS — Z87.19 PERSONAL HISTORY OF OTHER DISEASES OF THE DIGESTIVE SYSTEM: Chronic | ICD-10-CM

## 2022-10-19 DIAGNOSIS — D21.9 BENIGN NEOPLASM OF CONNECTIVE AND OTHER SOFT TISSUE, UNSPECIFIED: Chronic | ICD-10-CM

## 2022-10-19 PROCEDURE — 76641 ULTRASOUND BREAST COMPLETE: CPT | Mod: 26,50

## 2022-10-19 PROCEDURE — 77067 SCR MAMMO BI INCL CAD: CPT

## 2022-10-19 PROCEDURE — 77063 BREAST TOMOSYNTHESIS BI: CPT | Mod: 26

## 2022-10-19 PROCEDURE — 77067 SCR MAMMO BI INCL CAD: CPT | Mod: 26

## 2022-10-19 PROCEDURE — 77063 BREAST TOMOSYNTHESIS BI: CPT

## 2022-10-19 PROCEDURE — 76641 ULTRASOUND BREAST COMPLETE: CPT

## 2023-01-19 NOTE — PATIENT PROFILE ADULT - DEAF OR HARD OF HEARING?
Assessment & Plan   (F41.9) Anxiety in pediatric patient  (primary encounter diagnosis)  (F32.A) Depression in pediatric patient  Comment:   Plan: FLUoxetine (PROZAC) 10 MG tablet  Meeting criteria for depressive disorder and anxiety disorder         Depression (at least 5)  1.  Depressed mood for most of the day, nearly every day - yes  2.  Diminished interest or pleasure in most activities - yees  3.  Significant weight gain or loss, decreased appetite - yes  4.  Slowing of thought and movement (observable by others) - yes  5.  Feelings of worthlessness or excessive guilt - no  6.  Diminished ability to think or concentrate, indecisiveness nearly every day - yes   7.  Recurrent thoughts of death with or without a specific plan - thoughts without a plan      Generalized anxiety disorder  1.  Excessive anxiety and worry about a variety of topics ongoing for at least 6 months.  yes  2.  The worry is very challenging to control yes  3.  The anxiety and worry are accompanied by at least 1-3 of the following physical or cognitive symptoms:   - Edginess or restlessness yes  - tiring easily, more fatigued than usual yes  - impaired concentration or feeling as though the mind goes blank yes  -irritability yes   -increased muscle aches and soreness no   -sleep difficulties yes    We discussed counseling, self care and possibly medication management.  Starting treatment with selective serotonin reuptake inhibitor's was discussed including mechanism of action, anticipated benefits of improved mood and ability to handle stressful circumstances, as well as possible side effects including the black box warning.  At this time patient and parents wish to start treatment.      (E55.9) Vitamin D insufficiency  Comment:   Plan: vitamin D2 (ERGOCALCIFEROL) 10060 units (1250         mcg) capsule        Treating Vit d insufficiency with high dose vit d given that insufficiency can affect mood.         30+ minutes spent on the date of  "the encounter doing chart review, history and exam, documentation and further activities per the note        Follow Up  No follow-ups on file.  Has a follow up appointment scheduled with me in 6 weeks for her well check.  Follow up sooner if symptoms of depression worsening or not improving.     Leisa Chavez MD        Julio Ramírez is a 16 year old accompanied by her father, presenting for the following health issues:  Mental Health Problem      History of Present Illness       Reason for visit:  Mental health  Symptom onset:  More than a month  Symptoms include:  Tiredness anxiety sadness negative self talk not eating or drinking  Symptom intensity:  Moderate  Symptom progression:  Worsening  Had these symptoms before:  No  What makes it worse:  Getting asked a bunch of questions or lots of talking  What makes it better:  Distracting myself      Desiree is here today to discuss her problems with anxiety and depression    Started having anxiety a year and a half ago when she went from attending a really small school to a very large high school last year.  At first she was a little anxious in large groups but did pretty well.  Enjoyed participating in a play and met friends.  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    For the past 3 months has been feeling tired -  really worn down, feeling overwhelmed.  Grades cause her stress although she is doing very well with her grades.  She's in 3 AP classes.  Has been meeting on and off with a therapist.    Has never been a procrasinator but just recently has started pushing things off that she knows will be really hard  She talks about how tired she has been.  At times she will be curled up in a ball and not wanting do move  Says that she knows what she needs to do (constantly has a checklist in her brain) but just cannot force herself to do it    Starting around a month ago she started a bit of restricting " "herself from eating  Desiree states that she has passive thoughts of death but does not feel suicidal.  She does have hope for improvement in the future.       VIt d checked last year and was 24 but didn't really take anything           Review of Systems   Constitutional, eye, ENT, skin, respiratory, cardiac, and GI are normal except as otherwise noted.      Objective    /76   Pulse 70   Temp 97.5  F (36.4  C) (Oral)   Ht 5' 5.2\" (1.656 m)   Wt 115 lb 12.8 oz (52.5 kg)   SpO2 100%   BMI 19.15 kg/m    46 %ile (Z= -0.11) based on [facility] (Girls, 2-20 Years) weight-for-age data using vitals from 1/19/2023.  Blood pressure reading is in the normal blood pressure range based on the 2017 AAP Clinical Practice Guideline.    Physical Exam   GENERAL:  Alert and interactive., EYES:  Normal extra-ocular movements.  PERRLA, LUNGS:  Clear, HEART:  Normal rate and rhythm.  Normal S1 and S2.  No murmurs., NEURO:  No tics or tremor.  Normal tone and strength. Normal gait and balance.  and MENTAL HEALTH: Mood and affect are neutral. There is good eye contact with the examiner.  Patient appears relaxed and well groomed.  No psychomotor agitation or retardation.  Thought content seems intact and some insight is demonstrated.  Speech is unpressured.    Diagnostics: none                  " no
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
## 2023-12-18 ENCOUNTER — EMERGENCY (EMERGENCY)
Facility: HOSPITAL | Age: 48
LOS: 1 days | Discharge: HOME | End: 2023-12-18
Attending: EMERGENCY MEDICINE
Payer: COMMERCIAL

## 2023-12-18 VITALS
SYSTOLIC BLOOD PRESSURE: 134 MMHG | HEART RATE: 78 BPM | RESPIRATION RATE: 16 BRPM | HEIGHT: 67 IN | TEMPERATURE: 98 F | WEIGHT: 293 LBS | DIASTOLIC BLOOD PRESSURE: 97 MMHG | OXYGEN SATURATION: 98 %

## 2023-12-18 VITALS
TEMPERATURE: 98 F | HEART RATE: 62 BPM | SYSTOLIC BLOOD PRESSURE: 100 MMHG | RESPIRATION RATE: 16 BRPM | DIASTOLIC BLOOD PRESSURE: 80 MMHG | OXYGEN SATURATION: 98 %

## 2023-12-18 DIAGNOSIS — D21.9 BENIGN NEOPLASM OF CONNECTIVE AND OTHER SOFT TISSUE, UNSPECIFIED: Chronic | ICD-10-CM

## 2023-12-18 DIAGNOSIS — Z87.19 PERSONAL HISTORY OF OTHER DISEASES OF THE DIGESTIVE SYSTEM: Chronic | ICD-10-CM

## 2023-12-18 DIAGNOSIS — Z86.79 PERSONAL HISTORY OF OTHER DISEASES OF THE CIRCULATORY SYSTEM: Chronic | ICD-10-CM

## 2023-12-18 DIAGNOSIS — Z98.89 OTHER SPECIFIED POSTPROCEDURAL STATES: Chronic | ICD-10-CM

## 2023-12-18 PROCEDURE — 73630 X-RAY EXAM OF FOOT: CPT | Mod: 26,RT

## 2023-12-18 PROCEDURE — 99283 EMERGENCY DEPT VISIT LOW MDM: CPT

## 2023-12-18 PROCEDURE — 73630 X-RAY EXAM OF FOOT: CPT

## 2023-12-18 PROCEDURE — 99284 EMERGENCY DEPT VISIT MOD MDM: CPT | Mod: 25

## 2023-12-18 RX ORDER — ACETAMINOPHEN 500 MG
650 TABLET ORAL ONCE
Refills: 0 | Status: COMPLETED | OUTPATIENT
Start: 2023-12-18 | End: 2023-12-18

## 2023-12-18 RX ADMIN — Medication 650 MILLIGRAM(S): at 11:57

## 2023-12-18 NOTE — ED PROVIDER NOTE - NSFOLLOWUPINSTRUCTIONS_ED_ALL_ED_FT
You were seen in the ED for right foot pain.    Your foot x-ray was negative for fracture or dislocation.    You may continue to experience pain.  For this pain you may alternate Tylenol 500 mg every 6-8 hours and/or ibuprofen 600 mg every 6-8 hours as needed.  Please rest, ice and elevate to help reduce swelling and pain.    A podiatrist may be helpful as you manage your R. foot pain. A representative from [place] will contact you to help set up this appointment.    Please follow-up with your primary care doctor within 1 to 3 days to ensure your right foot pain continues to improve.    If you develop increased pain of your right foot, numbness or tingling, you can no longer bear weight on the foot, or for any other questions or concerns please return to the emergency department. You were seen in the ED for right foot pain.    Your foot x-ray was negative for fracture or dislocation.    You may continue to experience pain.  For this pain you may alternate Tylenol 500 mg every 6-8 hours and/or ibuprofen 600 mg every 6-8 hours as needed.  Please rest, ice and elevate to help reduce swelling and pain.    A podiatrist may be helpful as you manage your R. foot pain. A representative from [place] will contact you to help set up this appointment.    Please follow-up with your primary care doctor within 1 to 3 days to ensure your right foot pain continues to improve.    If you develop increased pain of your right foot, numbness or tingling, you can no longer bear weight on the foot, or for any other questions or concerns please return to the emergency department.

## 2023-12-18 NOTE — ED PROVIDER NOTE - PHYSICAL EXAMINATION
GEN: Patient awake and alert. No acute distress, non-toxic.  Head: normocephalic, atraumatic.  Neck: full ROM  Eyes: EOMI  CARDIAC: RRR. Normal S1, S2. No murmur  PULM: CTA B/L no wheeze  MSK: Moving all extremities spontaneously. R. foot swollen over the dorsum, pulses 2+, minimal tenderness to palpation. normal sensation throughout. able to flex and extend each toe. No lacerations, discolorations   SKIN: Warm, dry.

## 2023-12-18 NOTE — ED PROVIDER NOTE - PATIENT PORTAL LINK FT
You can access the FollowMyHealth Patient Portal offered by [facility] by registering at the following website: [email or web address]. By joining [facility]’s FollowMyHealth portal, you will also be able to view your health information using other applications (apps) compatible with our system. You can access the FollowMyHealth Patient Portal offered by [facility] by registering at the following website: [email or web address]. By joining [facility]’s FollowMyHealth portal, you will also be able to view your health information using other applications (apps) compatible with our system.
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
## 2023-12-18 NOTE — ED ADULT NURSE NOTE - NSFALLRISKINTERV_ED_ALL_ED
Communicate fall risk and risk factors to all staff, patient, and family/Provide visual cue: yellow wristband, yellow gown, etc/Reinforce activity limits and safety measures with patient and family/Call bell, personal items and telephone in reach/Instruct patient to call for assistance before getting out of bed/chair/stretcher/Non-slip footwear applied when patient is off stretcher/[place] to call system/Physically safe environment - no spills, clutter or unnecessary equipment/Purposeful Proactive Rounding/Room/bathroom lighting operational, light cord in reach Communicate fall risk and risk factors to all staff, patient, and family/Provide visual cue: yellow wristband, yellow gown, etc/Reinforce activity limits and safety measures with patient and family/Call bell, personal items and telephone in reach/Instruct patient to call for assistance before getting out of bed/chair/stretcher/Non-slip footwear applied when patient is off stretcher/[place] to call system/Physically safe environment - no spills, clutter or unnecessary equipment/Purposeful Proactive Rounding/Room/bathroom lighting operational, light cord in reach
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
## 2023-12-21 ENCOUNTER — APPOINTMENT (OUTPATIENT)
Dept: PODIATRY | Facility: CLINIC | Age: 48
End: 2023-12-21
Payer: COMMERCIAL

## 2023-12-21 DIAGNOSIS — S90.31XA CONTUSION OF RIGHT FOOT, INITIAL ENCOUNTER: ICD-10-CM

## 2023-12-21 PROCEDURE — 99203 OFFICE O/P NEW LOW 30 MIN: CPT

## 2023-12-25 NOTE — PROCEDURE
[FreeTextEntry1] : X-rays were taken at [facility] and independently reviewed by myself.    No evidence of any fracture/dislocation.

## 2023-12-25 NOTE — PHYSICAL EXAM
[2+] : left foot dorsalis pedis 2+ [No Joint Swelling] : no joint swelling [Normal Foot/Ankle] : Both lower extremities were exposed and visualized. Standing exam demonstrates normal foot posture and alignment. Hindfoot exam shows no hindfoot valgus or varus [Skin Color & Pigmentation] : normal skin color and pigmentation [Skin Turgor] : normal skin turgor [Skin Lesions] : no skin lesions [Sensation] : the sensory exam was normal to light touch and pinprick [No Focal Deficits] : no focal deficits [Deep Tendon Reflexes (DTR)] : deep tendon reflexes were 2+ and symmetric [Motor Exam] : the motor exam was normal [General Appearance - Alert] : alert [Oriented To Time, Place, And Person] : oriented to person, place, and time [Ankle Swelling (On Exam)] : not present [Varicose Veins Of Lower Extremities] : not present [] : not present [Delayed in the Right Toes] : capillary refills normal in right toes [Delayed in the Left Toes] : capillary refills normal in the left toes [de-identified] : No pain on palpation to the midfoot or the lateral aspect of the right foot.  Mild pain with extreme abduction of the forefoot upon the rearfoot.   [Foot Ulcer] : no foot ulcer [Skin Induration] : no skin induration

## 2023-12-25 NOTE — ASSESSMENT
[FreeTextEntry1] : Impression: Contusion, right foot (S90.31XA).  Treatment: This should resolve without any further intervention.  Patient was given home care instructions.  Any questions or problems, patient is to contact the office.
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
## 2023-12-28 ENCOUNTER — NON-APPOINTMENT (OUTPATIENT)
Age: 48
End: 2023-12-28

## 2024-01-03 NOTE — H&P PST ADULT - ASSESSMENT
98.2 47 y/o female with hx of uterine fibroids, s/p uterine fibroid embolization 9/2021.  Polyp was discovered, biopsy showed endometrial intraepithelial neoplasia. Scheduled for Exploratory Laparotomy MUSA, bilateral salpingectomy, possible bilateral BSO.

## 2024-03-28 NOTE — PATIENT PROFILE ADULT - FALL HARM RISK - FALLEN IN PAST
Thank you for choosing Dr. Shayne Souza at [facility]. It's a pleasure to be a part of your healthcare team. Please let us know if you need anything---the nursing staff can be reached at [phone]. Have a great day! You may receive a short survey from [facility] about this visit. We appreciate your feedback.     Your Healthcare Providers,  Dr. Mariam Anders, Robin Baldwin, Catina Carranza RN, and ZEE Hutchinson
No

## 2024-04-18 ENCOUNTER — RESULT REVIEW (OUTPATIENT)
Age: 49
End: 2024-04-18

## 2024-04-18 ENCOUNTER — APPOINTMENT (OUTPATIENT)
Dept: MAMMOGRAPHY | Facility: IMAGING CENTER | Age: 49
End: 2024-04-18
Payer: COMMERCIAL

## 2024-04-18 ENCOUNTER — OUTPATIENT (OUTPATIENT)
Dept: OUTPATIENT SERVICES | Facility: HOSPITAL | Age: 49
LOS: 1 days | End: 2024-04-18
Payer: COMMERCIAL

## 2024-04-18 ENCOUNTER — APPOINTMENT (OUTPATIENT)
Dept: ULTRASOUND IMAGING | Facility: IMAGING CENTER | Age: 49
End: 2024-04-18
Payer: COMMERCIAL

## 2024-04-18 DIAGNOSIS — Z86.79 PERSONAL HISTORY OF OTHER DISEASES OF THE CIRCULATORY SYSTEM: Chronic | ICD-10-CM

## 2024-04-18 DIAGNOSIS — D21.9 BENIGN NEOPLASM OF CONNECTIVE AND OTHER SOFT TISSUE, UNSPECIFIED: Chronic | ICD-10-CM

## 2024-04-18 DIAGNOSIS — Z98.89 OTHER SPECIFIED POSTPROCEDURAL STATES: Chronic | ICD-10-CM

## 2024-04-18 DIAGNOSIS — Z87.19 PERSONAL HISTORY OF OTHER DISEASES OF THE DIGESTIVE SYSTEM: Chronic | ICD-10-CM

## 2024-04-18 DIAGNOSIS — Z00.8 ENCOUNTER FOR OTHER GENERAL EXAMINATION: ICD-10-CM

## 2024-04-18 PROCEDURE — 76641 ULTRASOUND BREAST COMPLETE: CPT

## 2024-04-18 PROCEDURE — 76641 ULTRASOUND BREAST COMPLETE: CPT | Mod: 26,50

## 2024-04-18 PROCEDURE — 77067 SCR MAMMO BI INCL CAD: CPT

## 2024-04-18 PROCEDURE — 77063 BREAST TOMOSYNTHESIS BI: CPT | Mod: 26

## 2024-04-18 PROCEDURE — 77063 BREAST TOMOSYNTHESIS BI: CPT

## 2024-04-18 PROCEDURE — 77067 SCR MAMMO BI INCL CAD: CPT | Mod: 26

## 2024-05-22 NOTE — ASU PREOP CHECKLIST - STERILIZATION AFFIRMATION
AMS baseline oriented x1. Dx UTI on antibiotic from yesterday. LOw 02 sat-88. On 6 liters/mask- now 100% n/a

## 2025-03-11 ENCOUNTER — NON-APPOINTMENT (OUTPATIENT)
Age: 50
End: 2025-03-11

## 2025-04-24 ENCOUNTER — APPOINTMENT (OUTPATIENT)
Dept: ORTHOPEDIC SURGERY | Facility: CLINIC | Age: 50
End: 2025-04-24
Payer: COMMERCIAL

## 2025-04-24 VITALS — WEIGHT: 270 LBS | BODY MASS INDEX: 42.38 KG/M2 | HEIGHT: 67 IN

## 2025-04-24 DIAGNOSIS — M17.0 BILATERAL PRIMARY OSTEOARTHRITIS OF KNEE: ICD-10-CM

## 2025-04-24 DIAGNOSIS — I10 ESSENTIAL (PRIMARY) HYPERTENSION: ICD-10-CM

## 2025-04-24 PROCEDURE — 73562 X-RAY EXAM OF KNEE 3: CPT | Mod: 50

## 2025-04-24 PROCEDURE — 99203 OFFICE O/P NEW LOW 30 MIN: CPT | Mod: 25

## 2025-04-24 PROCEDURE — 20610 DRAIN/INJ JOINT/BURSA W/O US: CPT | Mod: 50

## 2025-04-28 ENCOUNTER — NON-APPOINTMENT (OUTPATIENT)
Age: 50
End: 2025-04-28

## 2025-04-28 ENCOUNTER — APPOINTMENT (OUTPATIENT)
Dept: OBGYN | Facility: CLINIC | Age: 50
End: 2025-04-28

## 2025-04-28 VITALS
WEIGHT: 268 LBS | HEIGHT: 67 IN | SYSTOLIC BLOOD PRESSURE: 116 MMHG | BODY MASS INDEX: 42.06 KG/M2 | DIASTOLIC BLOOD PRESSURE: 78 MMHG | HEART RATE: 70 BPM

## 2025-04-28 DIAGNOSIS — Z01.419 ENCOUNTER FOR GYNECOLOGICAL EXAMINATION (GENERAL) (ROUTINE) W/OUT ABNORMAL FINDINGS: ICD-10-CM

## 2025-04-28 PROCEDURE — 99396 PREV VISIT EST AGE 40-64: CPT

## 2025-04-28 PROCEDURE — G0444 DEPRESSION SCREEN ANNUAL: CPT | Mod: 59

## 2025-04-28 PROCEDURE — 99386 PREV VISIT NEW AGE 40-64: CPT

## 2025-04-28 PROCEDURE — 99459 PELVIC EXAMINATION: CPT | Mod: NC

## 2025-05-02 ENCOUNTER — APPOINTMENT (OUTPATIENT)
Dept: MRI IMAGING | Facility: CLINIC | Age: 50
End: 2025-05-02
Payer: COMMERCIAL

## 2025-05-02 ENCOUNTER — OUTPATIENT (OUTPATIENT)
Dept: OUTPATIENT SERVICES | Facility: HOSPITAL | Age: 50
LOS: 1 days | End: 2025-05-02
Payer: COMMERCIAL

## 2025-05-02 DIAGNOSIS — Z87.19 PERSONAL HISTORY OF OTHER DISEASES OF THE DIGESTIVE SYSTEM: Chronic | ICD-10-CM

## 2025-05-02 DIAGNOSIS — Z00.8 ENCOUNTER FOR OTHER GENERAL EXAMINATION: ICD-10-CM

## 2025-05-02 DIAGNOSIS — Z98.89 OTHER SPECIFIED POSTPROCEDURAL STATES: Chronic | ICD-10-CM

## 2025-05-02 DIAGNOSIS — D21.9 BENIGN NEOPLASM OF CONNECTIVE AND OTHER SOFT TISSUE, UNSPECIFIED: Chronic | ICD-10-CM

## 2025-05-02 DIAGNOSIS — Z86.79 PERSONAL HISTORY OF OTHER DISEASES OF THE CIRCULATORY SYSTEM: Chronic | ICD-10-CM

## 2025-05-02 PROCEDURE — 70546 MR ANGIOGRAPH HEAD W/O&W/DYE: CPT

## 2025-05-02 PROCEDURE — A9585: CPT

## 2025-05-02 PROCEDURE — 70546 MR ANGIOGRAPH HEAD W/O&W/DYE: CPT | Mod: 26

## 2025-05-23 ENCOUNTER — OUTPATIENT (OUTPATIENT)
Dept: OUTPATIENT SERVICES | Facility: HOSPITAL | Age: 50
LOS: 1 days | End: 2025-05-23
Payer: COMMERCIAL

## 2025-05-23 ENCOUNTER — APPOINTMENT (OUTPATIENT)
Dept: MAMMOGRAPHY | Facility: IMAGING CENTER | Age: 50
End: 2025-05-23
Payer: COMMERCIAL

## 2025-05-23 ENCOUNTER — RESULT REVIEW (OUTPATIENT)
Age: 50
End: 2025-05-23

## 2025-05-23 ENCOUNTER — APPOINTMENT (OUTPATIENT)
Dept: ULTRASOUND IMAGING | Facility: IMAGING CENTER | Age: 50
End: 2025-05-23
Payer: COMMERCIAL

## 2025-05-23 DIAGNOSIS — Z87.19 PERSONAL HISTORY OF OTHER DISEASES OF THE DIGESTIVE SYSTEM: Chronic | ICD-10-CM

## 2025-05-23 DIAGNOSIS — D21.9 BENIGN NEOPLASM OF CONNECTIVE AND OTHER SOFT TISSUE, UNSPECIFIED: Chronic | ICD-10-CM

## 2025-05-23 DIAGNOSIS — Z01.419 ENCOUNTER FOR GYNECOLOGICAL EXAMINATION (GENERAL) (ROUTINE) WITHOUT ABNORMAL FINDINGS: ICD-10-CM

## 2025-05-23 DIAGNOSIS — Z86.79 PERSONAL HISTORY OF OTHER DISEASES OF THE CIRCULATORY SYSTEM: Chronic | ICD-10-CM

## 2025-05-23 DIAGNOSIS — Z98.89 OTHER SPECIFIED POSTPROCEDURAL STATES: Chronic | ICD-10-CM

## 2025-05-23 PROCEDURE — 76641 ULTRASOUND BREAST COMPLETE: CPT

## 2025-05-23 PROCEDURE — 77063 BREAST TOMOSYNTHESIS BI: CPT

## 2025-05-23 PROCEDURE — 76641 ULTRASOUND BREAST COMPLETE: CPT | Mod: 26,50

## 2025-05-23 PROCEDURE — 77063 BREAST TOMOSYNTHESIS BI: CPT | Mod: 26

## 2025-05-23 PROCEDURE — 77067 SCR MAMMO BI INCL CAD: CPT | Mod: 26

## 2025-05-23 PROCEDURE — 77067 SCR MAMMO BI INCL CAD: CPT

## 2025-07-24 ENCOUNTER — APPOINTMENT (OUTPATIENT)
Dept: ORTHOPEDIC SURGERY | Facility: CLINIC | Age: 50
End: 2025-07-24
Payer: COMMERCIAL

## 2025-07-24 PROCEDURE — 99214 OFFICE O/P EST MOD 30 MIN: CPT

## 2025-07-24 RX ORDER — DICLOFENAC SODIUM 75 MG/1
75 TABLET, DELAYED RELEASE ORAL
Qty: 30 | Refills: 0 | Status: ACTIVE | COMMUNITY
Start: 2025-07-24 | End: 1900-01-01

## 2025-08-11 ENCOUNTER — APPOINTMENT (OUTPATIENT)
Dept: ORTHOPEDIC SURGERY | Facility: CLINIC | Age: 50
End: 2025-08-11

## 2025-08-11 DIAGNOSIS — M17.0 BILATERAL PRIMARY OSTEOARTHRITIS OF KNEE: ICD-10-CM

## (undated) DEVICE — Device

## (undated) DEVICE — SUT VICRYL 2-0 27" SH UNDYED

## (undated) DEVICE — DRAPE GENERAL ENDOSCOPY

## (undated) DEVICE — PACK PERI GYN

## (undated) DEVICE — PACK MAJOR ABDOMINAL WITH LAP

## (undated) DEVICE — SPONGE PEANUT AUTO COUNT

## (undated) DEVICE — DRSG MASTISOL

## (undated) DEVICE — FOLEY TRAY 16FR LF URINE METER SURESTEP

## (undated) DEVICE — ELCTR BOVIE BLADE 3/4" EXTENDED LENGTH 6"

## (undated) DEVICE — SUT PDS II 1 48" TP-1

## (undated) DEVICE — SUT VICRYL 0 36" CT-1 UNDYED

## (undated) DEVICE — SOL IRR POUR H2O 250ML

## (undated) DEVICE — DRSG STERISTRIPS 0.5 X 4"

## (undated) DEVICE — SUT VICRYL 0 18" TIES UNDYED

## (undated) DEVICE — GLV 5.5 PROTEXIS (WHITE)

## (undated) DEVICE — DRSG TELFA 3 X 8

## (undated) DEVICE — SOL IRR POUR NS 0.9% 500ML

## (undated) DEVICE — GOWN LG

## (undated) DEVICE — FOLEY TRAY 16FR 5CC LF UMETER CLOSED

## (undated) DEVICE — TIP METZENBAUM SCISSOR MONOPOLAR ENDOCUT (ORANGE)

## (undated) DEVICE — GLV 6 PROTEXIS (BLUE)

## (undated) DEVICE — WARMING BLANKET UPPER ADULT

## (undated) DEVICE — DRAPE INSTRUMENT POUCH 6.75" X 11"

## (undated) DEVICE — VENODYNE/SCD SLEEVE CALF MEDIUM

## (undated) DEVICE — LAP PAD 18 X 18"

## (undated) DEVICE — MEDICINE CUP WITH LID 60ML

## (undated) DEVICE — SUT PLAIN GUT 2-0 27" CT-1

## (undated) DEVICE — DRAPE 3/4 SHEET 52X76"

## (undated) DEVICE — PREP BETADINE KIT

## (undated) DEVICE — TROCAR COVIDIEN VERSAONE BLADELESS FIXATION 5MM STANDARD

## (undated) DEVICE — SUT QUILL MONODERM 3-0 30CM PS-2

## (undated) DEVICE — BASIN SET SINGLE

## (undated) DEVICE — POSITIONER FOAM EGG CRATE ULNAR 2PCS (PINK)

## (undated) DEVICE — LIGASURE IMPACT

## (undated) DEVICE — SUT VICRYL 3-0 27" SH UNDYED

## (undated) DEVICE — DRSG MEDIPORE + PAD 3.5X10"

## (undated) DEVICE — PREP BETADINE SPONGE STICKS

## (undated) DEVICE — DRAPE FLUID WARMER 44 X 44"